# Patient Record
Sex: MALE | Race: BLACK OR AFRICAN AMERICAN | NOT HISPANIC OR LATINO | Employment: FULL TIME | URBAN - METROPOLITAN AREA
[De-identification: names, ages, dates, MRNs, and addresses within clinical notes are randomized per-mention and may not be internally consistent; named-entity substitution may affect disease eponyms.]

---

## 2017-01-04 ENCOUNTER — HISTORICAL (OUTPATIENT)
Dept: ADMINISTRATIVE | Facility: HOSPITAL | Age: 42
End: 2017-01-04

## 2020-01-14 ENCOUNTER — HISTORICAL (OUTPATIENT)
Dept: RADIOLOGY | Facility: HOSPITAL | Age: 45
End: 2020-01-14

## 2020-10-07 ENCOUNTER — HISTORICAL (OUTPATIENT)
Dept: ADMINISTRATIVE | Facility: HOSPITAL | Age: 45
End: 2020-10-07

## 2020-10-09 LAB — FINAL CULTURE: NO GROWTH

## 2021-10-25 ENCOUNTER — HISTORICAL (OUTPATIENT)
Dept: ADMINISTRATIVE | Facility: HOSPITAL | Age: 46
End: 2021-10-25

## 2021-11-08 ENCOUNTER — HISTORICAL (OUTPATIENT)
Dept: ADMINISTRATIVE | Facility: HOSPITAL | Age: 46
End: 2021-11-08

## 2022-04-10 ENCOUNTER — HISTORICAL (OUTPATIENT)
Dept: ADMINISTRATIVE | Facility: HOSPITAL | Age: 47
End: 2022-04-10
Payer: MEDICAID

## 2022-04-27 VITALS
WEIGHT: 176.13 LBS | BODY MASS INDEX: 26.09 KG/M2 | HEIGHT: 69 IN | DIASTOLIC BLOOD PRESSURE: 84 MMHG | SYSTOLIC BLOOD PRESSURE: 137 MMHG

## 2022-05-04 NOTE — HISTORICAL [facility] CERNER
This is a historical note converted from [facility]. Formatting and pictures may have been removed.  Please reference Floyd for original formatting and attached multimedia. Chief Complaint  pain to left hand.  History of Present Illness  47 yo?male?non-smoker?presents to ortho clinic for?initial visit?for?left?hand pain.?  ?   Patient reports punching object approximately on 10/10/21. Patient had acute onset swelling and later presented to the ED two days later for evaluation. Patient diagnosed with boxers fracture of the 5th mcp and sent here today for follow up.  Today: Patient localizes pain to the 5th MCP. Patient reports pain is slighlty improved since injury. Patient reports limited ROM 2/2 to pain. Patient has not splinted hand and actively treating his pain with antinflammatories.  Onset: ?Insidious over years?progressively worsening  Current pain level: 6/10??without pain medication. Quality described as??sharp?.? Patient?acknowledges?use of pain medication today.?  Medications r/t complaint: Patient reports using?OTC?medications in past including:?OTC pain relievers including ibuprofen  Modifying Factors: ?Worse with/after activity;Improved with rest  Injury:?10/10/21  Previous treatment: NSAIDs/Tylenol  Associated Symptoms:?Crepitus/Grinding; ?Numbness/ tingling;??No swelling;?No skin changes;?No weakness;?Moderate decrease in ROM;?difficulty sleeping at night s/t pain  Activity:?Daily activity / working _;?Pain occasionally interferes with ADLs (moderate)  Family History:?Family history of arthritis  PMH:? DM, HTN and Obesity ?  PCP:?Dies, NP.  ?  Review of Systems  10 review of systems negative except as stated in HPI  Physical Exam  Vitals & Measurements  HR:?89(Peripheral)? BP:?141/88?  HT:?176.00?cm? WT:?80.000?kg? BMI:?25.83?  General: well developed; well nourished; cooperative  PSYCH: alert and oriented with?appropriate mood and affect  SKIN: inspection and palpation of skin and soft tissue normal;  no scars noted on upper/lower extremities  CV: vascular integrity noted; +2 symmetrical pulses, no edema  NEURO: sensation intact by light touch; DTRs +2 bilateral and symmetrical  LYMPH: no LAD noted  ?  MSK:?LEFTHAND/ WRIST  General: No apparent distress  Inspection:?No significant deformity, no skin discoloration, no contracture;?no scars; no muscle atrophy to the thenar eminence or intrinsic muscles of the first web space  Palpation:?TTP at 5th metacarpal head.?minimal?swelling at 5th mcp ;?No bony enlargement  ROM:?  ?????5th MCP - reduced active and passive flexion. Limited active extension, however able to passively extend 5th mcp to slightly past neutral.  Strength:??  ????? strength:?Reduced?2/2 to fracture  Neurovascular:?Intact; 2+?distal pulse, sensation intact to light touch  ?  MSK:?RIGHTHAND/ WRIST  General: No apparent distress;?morbid obesity  Inspection:?No masses/cyst/nodules,?no deformity, no skin discoloration, no contracture;?no scars; no muscle atrophy to the thenar eminence or intrinsic muscles of the first web space  Palpation:?Non-tender;??No swelling;?No bony enlargement  ROM:?  ?????Open/closure of hand:?no abnormalities, movement is full and smooth  Strength:??  ????? strength:?Normal  Special Tests:  ?????Trigger finger presence:??Negative  ?????Phalen test:?Negative?  ?????Tinel test:?Negative?  ?????Khanh carpal compression:?Negative  ?????Finkelstein test:?Negative  Neurovascular:?Intact; 2+?distal pulse, sensation intact to light touch  ?  Assessment/Plan  1.?Closed boxers fracture?S62.339A  DX:?Closed boxers fracture of the left hand. Patient now presenting 15 days out from initial injury with mild 5th metacarpal neck angulation. No significant shortening, angulation or rotational deformity. Patient exam noted for stiffness likely d/t guarding but has reassuring passive ROM.  Discussed with patient diagnosis and treatment recommendations.? Handout  given.  Imaging:?radiological studies ordered and independently reviewed; discussed with patient; pending radiologist interpretation  Treatment plan: Ibuprofen and Tylenol pain PRN pain. boxer Splint applied today. Return to clinic in 1 week for repeat xrays and repeat examination. Anticipate ordering formal PT/OT.  Weight Management is paramount:?recommend at least 10 pounds weight loss  Activity:?Activity as tolerated  Therapy:?None at this time. Immobilization.  Medication:?Ibuprofen and Tylenol  RTC:?1 week  Additional work-up:?none  2.?HTN (hypertension)?I10  Nonsteroidal anti-inflammatory drugs (NSAIDs) may disrupt control of blood pressure in hypertensive patients and increase their risk of morbidity, mortality, and the costs of care. In general, people with high blood pressure should use acetaminophen or possibly aspirin for over-the-counter pain relief. Discuss with your primary health care provider if the use of any NSIADs (such as ibuprofen, ketoprofen, naproxen sodium, or ?meloxicam) is appropriate for you.  3.?DM2 (diabetes mellitus, type 2)?E11.9  Management as per PCP  Dietary and lifestyle changes  ?  Faculty Attestation:?Thomas Thomas?was seen in?[facility].??Discussed with  at the time of the visit.?History of Present Illness, Physical Exam, and Assessment and Plan reviewed. Treatment plan is reasonable and appropriate. Compliance with treatment recommendations is important.??Radiology images independently reviewed and agree with radiologist interpretation.?- Elgin Heredia MD  Orders:  XR Hand Left Minimum 3 Views, Routine, 10/25/21 14:24:00 CDT, Pain, None, Ambulatory, Rad Type, Pain in left hand, Not Scheduled, 10/25/21 14:24:00 CDT   Medications  acetaminophen-hydrocodone 325 mg-5 mg oral tablet, 1 tab(s), Oral, q6hr,? ?Not Taking, Completed Rx  diclofenac sodium 75 mg oral delayed release tablet, 75 mg= 1 tab(s), Oral, BID, PRN,? ?Not taking  Hibiclens 4% topical soap,  See Instructions,? ?Not taking  ibuprofen 800 mg oral tablet, 800 mg= 1 tab(s), Oral, TID  Jardiance 10 mg oral tablet, 10 mg= 1 tab(s), Oral, Daily,? ?Not taking  metFORMIN 1000 mg oral tablet, 1000 mg= 1 tab(s), Oral, BID, 1 refills  Norco 5 mg-325 mg oral tablet, 1 tab(s), Oral, TID, PRN  Norvasc 10 mg oral tablet, 10 mg= 1 tab(s), Oral, Daily, 5 refills  Norvasc 10 mg oral tablet, 10 mg= 1 tab(s), Oral, Daily, 1 refills  promethazine 25 mg oral tablet, 25 mg= 1 tab(s), Oral, q4hr, PRN,? ?Not taking  Protonix 40 mg ORAL enteric coated tablet, 40 mg= 1 tab(s), Oral, Daily, 1 refills  traZODONE 50 mg oral tablet ( Desyrel ), 50 mg, Oral, qPM,? ?Not taking  Zofran 8 mg oral tablet, 8 mg= 1 tab(s), Oral, TID, PRN,? ?Not taking  Zofran ODT 4 mg oral tablet, disintegrating, 4 mg= 1 tab(s), Oral, q8hr, PRN  Zofran ODT 8 mg oral tablet, disintegrating, 8 mg= 1 tab(s), Oral, TID, PRN, 1 refills  Zoloft 100 mg oral tablet, 100 mg= 1 tab(s), Oral, Daily,? ?Not taking  Diagnostic Results  Xray of the left hand - shows minimally angulated 5th metacarpal neck.

## 2022-05-04 NOTE — HISTORICAL [facility]
This is a historical note converted from [facility]. Formatting and pictures may have been removed.  Please reference [facility] for original formatting and attached multimedia. Chief Complaint  left hand pain.  History of Present Illness  45 yo?male?non-smoker?presents to ortho clinic for?follow up visit?for?left boxers fracture.  Last seen?approximately 2 weeks ago?and placed in a splint. ?Has started range of motion exercises as tolerated as instructed at previous visit with notable improvement in his pain and range of motion since the last visit. ?Only endorses minimal pain at the?left metacarpal head otherwise is doing well. ?Has been taking Mobic as needed.? Denies any?weakness or numbness and tingling.?  Current pain level: 6/10??without pain medication. Quality described as??sharp?.? Patient?acknowledges?use of pain medication today.?  Medications r/t complaint: Patient reports using?OTC?medications in past including:?OTC pain relievers including ibuprofen  Modifying Factors: ?Worse with/after activity;Improved with rest  Injury:?10/10/21  Previous treatment: Mobic and Boxers splint.  Associated Symptoms:?No Crepitus/Grinding; ?Numbness/ tingling;??No swelling;?No skin changes;?No weakness;?Mild decrease in ROM;?Denies difficulty sleeping at night s/t pain  Activity:?Daily activity / working.?Pain occasionally interferes with ADLs (mild)  Family History:?Family history of arthritis  PMH:? DM, HTN?  PCP:?Dies, NP.  Review of Systems  10 review of systems negative except as stated in HPI  Physical Exam  Vitals & Measurements  HR:?75(Peripheral)? BP:?137/84?  HT:?176.00?cm? WT:?79.900?kg? BMI:?25.79?  General: well developed; well nourished; cooperative  PSYCH: alert and oriented with?appropriate mood and affect  SKIN: inspection and palpation of skin and soft tissue normal; no scars noted on upper/lower extremities  CV: vascular integrity noted; +2 symmetrical pulses, no edema  NEURO: sensation intact by light touch;  DTRs +2 bilateral and symmetrical  LYMPH: no LAD noted  ?  MSK:?LEFTHAND/ WRIST  Inspection:?No masses/cyst/nodules,?no deformity, no skin discoloration, no contracture;?no scars; no muscle atrophy to the thenar eminence or intrinsic muscles of the first web space  Palpation:?Minimal TTP at MCP of 5th digit  ROM:?  ?????Open/closure of hand: No limitation  Strength:??  ????? strength:?Reduced, however improved from prior exam 2 weeks ago.  Neurovascular:?Intact; 2+?distal pulse, sensation intact to light touch  ?  MSK:?RIGHTHAND/ WRIST  Inspection:?No masses/cyst/nodules, ?no skin discoloration, no contracture;?no scars; no muscle atrophy to the thenar eminence or intrinsic muscles of the first web space  Palpation:?Non-tender;??No swelling;?No bony enlargement  ROM:?  ?????Open/closure of hand:?no abnormalities, movement is full and smooth  Strength:??  ????? strength:?Normal  Neurovascular:?Intact; 2+?distal pulse, sensation intact to light touch  ?  Assessment/Plan  1.?Fracture of neck of metacarpal bone of left hand?S62.339A  ?DX:?Closed boxers fracture of the left hand. Patient now presenting?4 weeks out ?from initial injury with 5th metacarpal neck fracture?with minimal?angulation. No significant shortening or rotational deformity. Non-displaced?fracture of the 4th digit appears to be intact. Patient has had no pain or swelling at the site of injury since initial evaluation and appears to be stable on xray today. ?Patients exam noted for stiffness but improved from prior assessment with reassuring active and passive ROM.? Discussed with patient diagnosis and treatment recommendations.? Handout given.  Imaging:?radiological studies ordered and independently reviewed; discussed with patient; pending radiologist interpretation  Treatment plan: Ibuprofen and Tylenol pain PRN pain. Wean boxer Splint. Return to clinic in 4?week for repeat xrays and examination. Escalate ROM exercises. Formal PT  PRN.  Activity:?Activity as tolerated  Therapy:?Formal PT ordered. Patient to continue HEP and participate in formal PT if stiffness persists  Medication:?Ibuprofen and Tylenol  RTC: 4 weeks  Additional work-up:?none  Ordered:  Clinic Follow up, *Est. 12/06/21 3:00:00 CST, Order for future visit, Fracture of neck of metacarpal bone of left hand, [facility]  ?  2.?DM2 (diabetes mellitus, type 2)?E11.9  Dietary and lifestyle changes  Med management as per PCP  Educated on DM2 control essential?to fracture healing  ?  3.?HTN (hypertension)?I10  Nonsteroidal anti-inflammatory drugs (NSAIDs) may disrupt control of blood pressure in hypertensive patients and increase their risk of morbidity, mortality, and the costs of care. In general, people with high blood pressure should use acetaminophen or possibly aspirin for over-the-counter pain relief. Discuss with your primary health care provider if the use of any NSIADs (such as ibuprofen, ketoprofen, naproxen sodium, or ?meloxicam) is appropriate for you.  ?   Faculty Attestation:?Thomas Thomas?was seen in?[facility].??Discussed with  at the time of the visit.?History of Present Illness, Physical Exam, and Assessment and Plan reviewed. Treatment plan is reasonable and appropriate. Compliance with treatment recommendations is important.??Radiology images independently reviewed and agree with radiologist interpretation.?- Elgin Heredia MD  ?   Medications  acetaminophen-hydrocodone 325 mg-5 mg oral tablet, 1 tab(s), Oral, q6hr,? ?Not Taking, Completed Rx  diclofenac sodium 75 mg oral delayed release tablet, 75 mg= 1 tab(s), Oral, BID, PRN,? ?Not taking  Hibiclens 4% topical soap, See Instructions,? ?Not taking  Jardiance 10 mg oral tablet, 10 mg= 1 tab(s), Oral, Daily,? ?Not taking  metFORMIN 1000 mg oral tablet, 1000 mg= 1 tab(s), Oral, BID, 1 refills  Mobic 15 mg oral tablet, 15 mg= 1 tab(s), Oral, Daily, PRN  Norvasc 10 mg oral  tablet, 10 mg= 1 tab(s), Oral, Daily, 5 refills  Norvasc 10 mg oral tablet, 10 mg= 1 tab(s), Oral, Daily, 1 refills  promethazine 25 mg oral tablet, 25 mg= 1 tab(s), Oral, q4hr, PRN,? ?Not taking  Protonix 40 mg ORAL enteric coated tablet, 40 mg= 1 tab(s), Oral, Daily, 1 refills  traZODONE 50 mg oral tablet ( Desyrel ), 50 mg, Oral, qPM,? ?Not taking  Zofran 8 mg oral tablet, 8 mg= 1 tab(s), Oral, TID, PRN,? ?Not taking  Zofran ODT 4 mg oral tablet, disintegrating, 4 mg= 1 tab(s), Oral, q8hr, PRN  Zofran ODT 8 mg oral tablet, disintegrating, 8 mg= 1 tab(s), Oral, TID, PRN, 1 refills  Zoloft 100 mg oral tablet, 100 mg= 1 tab(s), Oral, Daily,? ?Not taking  Diagnostic Results  (10/25/2021 14:30 CDT XR Hand Left Minimum 3 Views)  FINDINGS:  ?  Fracture of the distal aspect of the fifth metacarpal again identified  with no significant change in position and alignment as compared with  the previous exam.  Fracture at the base of the distal phalanx of the fourth digit again  identified  There is some narrowing of the interphalangeal joints with some  narrowing of the first carpometacarpal joint  No blastic or lytic lesions.  Soft tissues within normal limits.  ?  IMPRESSION:  ?  Fractures as above with no significant change.  ?  Degenerative change [1]  ?  My Interpretation: Agree with findings above. Patient 5th mcp neck fracture and fracture at base of 4th MCP.     [1]?XR Hand Left Minimum 3 Views; Bhupinder Maloney MD 10/25/2021 14:30 CDT

## 2022-05-31 ENCOUNTER — HOSPITAL ENCOUNTER (EMERGENCY)
Facility: HOSPITAL | Age: 47
Discharge: HOME | End: 2022-05-31
Attending: INTERNAL MEDICINE
Payer: MEDICAID

## 2022-05-31 ENCOUNTER — APPOINTMENT (OUTPATIENT)
Dept: RADIOLOGY | Facility: HOSPITAL | Age: 47
End: 2022-05-31
Attending: INTERNAL MEDICINE
Payer: MEDICAID

## 2022-05-31 VITALS
BODY MASS INDEX: 29.92 KG/M2 | HEART RATE: 93 BPM | TEMPERATURE: 98 F | WEIGHT: 202 LBS | SYSTOLIC BLOOD PRESSURE: 156 MMHG | OXYGEN SATURATION: 98 % | DIASTOLIC BLOOD PRESSURE: 98 MMHG | RESPIRATION RATE: 22 BRPM | HEIGHT: 69 IN

## 2022-05-31 DIAGNOSIS — R07.9 CHEST PAIN, UNSPECIFIED TYPE: Primary | ICD-10-CM

## 2022-05-31 DIAGNOSIS — F41.9 ANXIETY: ICD-10-CM

## 2022-05-31 DIAGNOSIS — R07.9 CHEST PAIN: ICD-10-CM

## 2022-05-31 DIAGNOSIS — M62.82 NON-TRAUMATIC RHABDOMYOLYSIS: ICD-10-CM

## 2022-05-31 LAB
ALBUMIN SERPL-MCNC: 3.8 GM/DL (ref 3.5–5)
ALBUMIN/GLOB SERPL: 1.2 RATIO (ref 1.1–2)
ALP SERPL-CCNC: 71 UNIT/L (ref 40–150)
ALT SERPL-CCNC: 18 UNIT/L (ref 0–55)
AMPHET UR QL SCN: NEGATIVE
APPEARANCE UR: CLEAR
APTT PPP: 32.1 SECONDS (ref 23.4–33.9)
AST SERPL-CCNC: 20 UNIT/L (ref 5–34)
BARBITURATE SCN PRESENT UR: NEGATIVE
BASOPHILS # BLD AUTO: 0.03 X10(3)/MCL (ref 0–0.2)
BASOPHILS NFR BLD AUTO: 0.5 %
BENZODIAZ UR QL SCN: NEGATIVE
BILIRUB UR QL STRIP.AUTO: NEGATIVE MG/DL
BILIRUBIN DIRECT+TOT PNL SERPL-MCNC: 0.5 MG/DL
BNP BLD-MCNC: 23.1 PG/ML
BUN SERPL-MCNC: 12.5 MG/DL (ref 8.9–20.6)
CALCIUM SERPL-MCNC: 9 MG/DL (ref 8.4–10.2)
CANNABINOIDS UR QL SCN: POSITIVE
CHLORIDE SERPL-SCNC: 106 MMOL/L (ref 98–107)
CK MB SERPL-MCNC: 2.2 NG/ML
CK SERPL-CCNC: 467 U/L (ref 30–200)
CO2 SERPL-SCNC: 21 MMOL/L (ref 22–29)
COCAINE UR QL SCN: NEGATIVE
COLOR UR AUTO: YELLOW
CREAT SERPL-MCNC: 1.33 MG/DL (ref 0.73–1.18)
EOSINOPHIL # BLD AUTO: 0.07 X10(3)/MCL (ref 0–0.9)
EOSINOPHIL NFR BLD AUTO: 1.2 %
ERYTHROCYTE [DISTWIDTH] IN BLOOD BY AUTOMATED COUNT: 13.7 % (ref 11.5–17)
GLOBULIN SER-MCNC: 3.1 GM/DL (ref 2.4–3.5)
GLUCOSE SERPL-MCNC: 217 MG/DL (ref 74–100)
GLUCOSE UR QL STRIP.AUTO: NEGATIVE MG/DL
HCT VFR BLD AUTO: 46.2 % (ref 42–52)
HGB BLD-MCNC: 14.9 GM/DL (ref 14–18)
IMM GRANULOCYTES # BLD AUTO: 0.01 X10(3)/MCL (ref 0–0.02)
IMM GRANULOCYTES NFR BLD AUTO: 0.2 % (ref 0–0.43)
INR BLD: 1.08 (ref 2–3)
KETONES UR QL STRIP.AUTO: NEGATIVE MG/DL
LEUKOCYTE ESTERASE UR QL STRIP.AUTO: NEGATIVE UNIT/L
LYMPHOCYTES # BLD AUTO: 1.73 X10(3)/MCL (ref 0.6–4.6)
LYMPHOCYTES NFR BLD AUTO: 28.6 %
MAGNESIUM SERPL-MCNC: 2 MG/DL (ref 1.6–2.6)
MCH RBC QN AUTO: 27.9 PG (ref 27–31)
MCHC RBC AUTO-ENTMCNC: 32.3 MG/DL (ref 33–36)
MCV RBC AUTO: 86.4 FL (ref 80–94)
MDMA UR QL SCN: NEGATIVE
MONOCYTES # BLD AUTO: 0.38 X10(3)/MCL (ref 0.1–1.3)
MONOCYTES NFR BLD AUTO: 6.3 %
NEUTROPHILS # BLD AUTO: 3.8 X10(3)/MCL (ref 2.1–9.2)
NEUTROPHILS NFR BLD AUTO: 63.2 %
NITRITE UR QL STRIP.AUTO: NEGATIVE
NRBC BLD AUTO-RTO: 0 %
OPIATES UR QL SCN: NEGATIVE
PCP UR QL: NEGATIVE
PH UR STRIP.AUTO: 6 [PH]
PH UR: 6 [PH] (ref 3–11)
PLATELET # BLD AUTO: 212 X10(3)/MCL (ref 130–400)
PMV BLD AUTO: 10.5 FL (ref 9.4–12.4)
POCT GLUCOSE: 207 MG/DL (ref 70–110)
POTASSIUM SERPL-SCNC: 3.7 MMOL/L (ref 3.5–5.1)
PROT SERPL-MCNC: 6.9 GM/DL (ref 6.4–8.3)
PROT UR QL STRIP.AUTO: NEGATIVE MG/DL
PROTHROMBIN TIME: 13.8 SECONDS (ref 11.7–14.5)
RBC # BLD AUTO: 5.35 X10(6)/MCL (ref 4.7–6.1)
RBC UR QL AUTO: NEGATIVE UNIT/L
SODIUM SERPL-SCNC: 139 MMOL/L (ref 136–145)
SP GR UR STRIP.AUTO: 1.01
SPECIFIC GRAVITY, URINE AUTO (.000) (OHS): 1.01 (ref 1–1.03)
TROPONIN I SERPL-MCNC: 0.01 NG/ML (ref 0–0.04)
UROBILINOGEN UR STRIP-ACNC: 0.2 MG/DL
WBC # SPEC AUTO: 6.1 X10(3)/MCL (ref 4.5–11.5)

## 2022-05-31 PROCEDURE — 93005 ELECTROCARDIOGRAM TRACING: CPT

## 2022-05-31 PROCEDURE — 82553 CREATINE MB FRACTION: CPT | Performed by: INTERNAL MEDICINE

## 2022-05-31 PROCEDURE — 93010 EKG 12-LEAD: ICD-10-PCS | Mod: ,,, | Performed by: INTERNAL MEDICINE

## 2022-05-31 PROCEDURE — 81003 URINALYSIS AUTO W/O SCOPE: CPT | Performed by: INTERNAL MEDICINE

## 2022-05-31 PROCEDURE — 84484 ASSAY OF TROPONIN QUANT: CPT | Performed by: INTERNAL MEDICINE

## 2022-05-31 PROCEDURE — 83735 ASSAY OF MAGNESIUM: CPT | Performed by: INTERNAL MEDICINE

## 2022-05-31 PROCEDURE — 85730 THROMBOPLASTIN TIME PARTIAL: CPT | Performed by: INTERNAL MEDICINE

## 2022-05-31 PROCEDURE — 25000003 PHARM REV CODE 250: Performed by: INTERNAL MEDICINE

## 2022-05-31 PROCEDURE — 80307 DRUG TEST PRSMV CHEM ANLYZR: CPT | Performed by: INTERNAL MEDICINE

## 2022-05-31 PROCEDURE — 36415 COLL VENOUS BLD VENIPUNCTURE: CPT | Performed by: INTERNAL MEDICINE

## 2022-05-31 PROCEDURE — 93010 ELECTROCARDIOGRAM REPORT: CPT | Mod: ,,, | Performed by: INTERNAL MEDICINE

## 2022-05-31 PROCEDURE — 85610 PROTHROMBIN TIME: CPT | Performed by: INTERNAL MEDICINE

## 2022-05-31 PROCEDURE — 99285 EMERGENCY DEPT VISIT HI MDM: CPT | Mod: 25

## 2022-05-31 PROCEDURE — 82962 GLUCOSE BLOOD TEST: CPT

## 2022-05-31 PROCEDURE — 85025 COMPLETE CBC W/AUTO DIFF WBC: CPT | Performed by: INTERNAL MEDICINE

## 2022-05-31 PROCEDURE — 83880 ASSAY OF NATRIURETIC PEPTIDE: CPT | Performed by: INTERNAL MEDICINE

## 2022-05-31 PROCEDURE — 82550 ASSAY OF CK (CPK): CPT | Performed by: INTERNAL MEDICINE

## 2022-05-31 PROCEDURE — 80053 COMPREHEN METABOLIC PANEL: CPT | Performed by: INTERNAL MEDICINE

## 2022-05-31 PROCEDURE — 71045 X-RAY EXAM CHEST 1 VIEW: CPT | Mod: TC

## 2022-05-31 RX ORDER — NAPROXEN SODIUM 220 MG/1
324 TABLET, FILM COATED ORAL ONCE
Status: COMPLETED | OUTPATIENT
Start: 2022-05-31 | End: 2022-05-31

## 2022-05-31 RX ORDER — ONDANSETRON 4 MG/1
4 TABLET, ORALLY DISINTEGRATING ORAL EVERY 6 HOURS PRN
Qty: 15 TABLET | Refills: 0 | Status: SHIPPED | OUTPATIENT
Start: 2022-05-31 | End: 2023-04-23

## 2022-05-31 RX ORDER — NITROGLYCERIN 0.4 MG/1
0.4 TABLET SUBLINGUAL EVERY 5 MIN PRN
Qty: 100 TABLET | Refills: 0 | Status: SHIPPED | OUTPATIENT
Start: 2022-05-31 | End: 2023-05-31

## 2022-05-31 RX ORDER — NAPROXEN SODIUM 220 MG/1
81 TABLET, FILM COATED ORAL DAILY
Qty: 90 TABLET | Refills: 0 | Status: SHIPPED | OUTPATIENT
Start: 2022-05-31 | End: 2022-08-29

## 2022-05-31 RX ORDER — AMLODIPINE BESYLATE 10 MG/1
10 TABLET ORAL DAILY
COMMUNITY
Start: 2021-07-25 | End: 2023-04-23

## 2022-05-31 RX ORDER — METFORMIN HYDROCHLORIDE 1000 MG/1
1000 TABLET ORAL DAILY
COMMUNITY

## 2022-05-31 RX ADMIN — ASPIRIN 81 MG CHEWABLE TABLET 324 MG: 81 TABLET CHEWABLE at 04:05

## 2022-05-31 NOTE — ED PROVIDER NOTES
Source of History:  Patient, no limitations    Chief complaint:  Chest Pain (Pt to er c/o intermittent chestpain and sob onset 4 weeks ago.)      HPI:  Thomas Thomas is a 47 y.o. male presenting with Chest Pain (Pt to er c/o intermittent chestpain and sob onset 4 weeks ago.)       Intermittent chest pain for the past few weeks, this episode started around noon, atypical in description, tightness, not exertional, says he feels this way when he is anxious    The patient complains of chest pain and exertional chest pressure/discomfort. The discomfort is described as chest tightness, pressure-like with radiation to the none. Onset of symptoms was abrupt starting 4 weeks ago, intermittent course since that time. Each episode lasts for 3 hours. The episodes are brought on by unable to say . The patient also complains of anxiety over recent diagnosis of vascular calcifications by PCP. The patient denies fever. Patient's cardiac risk factors are diabetes mellitus, dyslipidemia, hypertension, male gender, obesity (BMI >= 30 kg/m2) and sedentary lifestyle. .  Care prior to arrival consisted of rest, with minimal relief.          Review of Systems   Constitutional symptoms:  Negative except as documented in HPI.   Skin symptoms:  Negative except as documented in HPI.   Eye symptoms:  Negative except as documented in HPI.   ENMT symptoms:  Negative except as documented in HPI.   Respiratory symptoms:  Shortness of breath  Cardiovascular symptoms:  Intermittent chest pain, no palpitations  Gastrointestinal symptoms:  Negative except as documented in HPI.    Genitourinary symptoms:  Negative except as documented in HPI.   Musculoskeletal symptoms:  Negative except as documented in HPI.   Neurologic symptoms:  Negative except as documented in HPI.   Psychiatric symptoms:  anxiety   Allergy/immunologic symptoms:  Negative except as documented in HPI.             Additional review of systems information: All other systems  "reviewed and otherwise negative.      Review of patient's allergies indicates:   Allergen Reactions    Iodine Nausea And Vomiting     Other reaction(s): Hives       PMH:  As per HPI and below:    No past medical history on file.     No family history on file.    No past surgical history on file.         There is no problem list on file for this patient.       Physical Exam:    BP (!) 156/98 (BP Location: Left arm, Patient Position: Sitting)   Pulse 93   Temp 97.7 °F (36.5 °C) (Temporal)   Resp (!) 22   Ht 5' 9" (1.753 m)   Wt 91.6 kg (202 lb)   SpO2 98%   BMI 29.83 kg/m²     Nursing note and vital signs reviewed.    General:  Alert, mild acute distress.   Skin: Normal for Ethnic Origin, No cyanosis  Eye: Vision unchanged, Pupils symmetric, No icterus   Cardiovascular:  Regular rate and rhythm, No edema  Chest Wall: No deformity, equal chest rise  Respiratory:  Lungs are clear to auscultation, respirations are non-labored.    Musculoskeletal:  No deformity, Normal perfusion to all extremities  Back: No bony tenderness  : No suprapubic pain, No CVA tenderness  Gastrointestinal:  Soft, Nontender, Non distended, Normal bowel sounds.    Neurological:  Alert and oriented to person, place, time, and situation, normal motor observed, normal speech observed.    Psychiatric:  Cooperative, anxious mood & affect.        Labs that have been ordered have been independently reviewed and interpreted by myself.     Old Chart Reviewed.      Initial Impression/ Differential Dx:      MDM:      Reviewed Nurses Note.    Reviewed Pertinent old records.    Orders Placed This Encounter    Pulse Oximeter    X-Ray Chest 1 View    CBC Auto Differential    Comprehensive Metabolic Panel    Protime-INR    APTT    BNP    Troponin I    Magnesium    Urinalysis, Reflex to Urine Culture Urine, Clean Catch    Drug Screen, Urine    CK-MB    CK    CBC with Differential    Ambulatory referral/consult to Cardiology    Cardiac " Monitoring - Adult    EKG 12-lead    POCT glucose    POCT glucose    aspirin chewable tablet 324 mg    ondansetron (ZOFRAN-ODT) 4 MG TbDL    nitroGLYCERIN (NITROSTAT) 0.4 MG SL tablet    aspirin 81 MG Chew                    Labs Reviewed   COMPREHENSIVE METABOLIC PANEL - Abnormal; Notable for the following components:       Result Value    Carbon Dioxide 21 (*)     Glucose Level 217 (*)     Creatinine 1.33 (*)     All other components within normal limits   PROTIME-INR - Abnormal; Notable for the following components:    INR 1.08 (*)     All other components within normal limits   CK - Abnormal; Notable for the following components:    Creatine Kinase 467 (*)     All other components within normal limits   CBC WITH DIFFERENTIAL - Abnormal; Notable for the following components:    MCHC 32.3 (*)     All other components within normal limits   POCT GLUCOSE - Abnormal; Notable for the following components:    POCT Glucose 207 (*)     All other components within normal limits   APTT - Normal   B-TYPE NATRIURETIC PEPTIDE - Normal   TROPONIN I - Normal   MAGNESIUM - Normal   CK-MB - Normal   CBC W/ AUTO DIFFERENTIAL    Narrative:     The following orders were created for panel order CBC Auto Differential.  Procedure                               Abnormality         Status                     ---------                               -----------         ------                     CBC with Differential[[id number]]        Abnormal            Final result                 Please view results for these tests on the individual orders.   URINALYSIS, REFLEX TO URINE CULTURE   DRUG SCREEN, URINE (BEAKER)   POCT GLUCOSE MONITORING CONTINUOUS          X-Ray Chest 1 View   Final Result      No acute findings.         Electronically signed by: Carlos Taylor   Date:    05/31/2022   Time:    16:33           Admission on 05/31/2022   Component Date Value Ref Range Status    POCT Glucose 05/31/2022 207 (A) 70 - 110 mg/dL Final     Sodium Level 05/31/2022 139  136 - 145 mmol/L Final    Potassium Level 05/31/2022 3.7  3.5 - 5.1 mmol/L Final    Chloride 05/31/2022 106  98 - 107 mmol/L Final    Carbon Dioxide 05/31/2022 21 (A) 22 - 29 mmol/L Final    Glucose Level 05/31/2022 217 (A) 74 - 100 mg/dL Final    Blood Urea Nitrogen 05/31/2022 12.5  8.9 - 20.6 mg/dL Final    Creatinine 05/31/2022 1.33 (A) 0.73 - 1.18 mg/dL Final    Calcium Level Total 05/31/2022 9.0  8.4 - 10.2 mg/dL Final    Protein Total 05/31/2022 6.9  6.4 - 8.3 gm/dL Final    Albumin Level 05/31/2022 3.8  3.5 - 5.0 gm/dL Final    Globulin 05/31/2022 3.1  2.4 - 3.5 gm/dL Final    Albumin/Globulin Ratio 05/31/2022 1.2  1.1 - 2.0 ratio Final    Bilirubin Total 05/31/2022 0.5  <=1.5 mg/dL Final    Alkaline Phosphatase 05/31/2022 71  40 - 150 unit/L Final    Alanine Aminotransferase 05/31/2022 18  0 - 55 unit/L Final    Aspartate Aminotransferase 05/31/2022 20  5 - 34 unit/L Final    Estimated GFR- 05/31/2022 >60  mls/min/1.73/m2 Final    PT 05/31/2022 13.8  11.7 - 14.5 seconds Final    INR 05/31/2022 1.08 (A) 2.00 - 3.00 Final    PTT 05/31/2022 32.1  23.4 - 33.9 seconds Final    Natriuretic Peptide 05/31/2022 23.1  <=100.0 pg/mL Final    Troponin-I 05/31/2022 0.010  0.000 - 0.045 ng/mL Final    Magnesium Level 05/31/2022 2.00  1.60 - 2.60 mg/dL Final    Color, UA 05/31/2022 Yellow  Yellow, Colorless, Other, Clear Final    Appearance, UA 05/31/2022 Clear  Clear Final    Specific Gravity, UA 05/31/2022 1.015   Final    pH, UA 05/31/2022 6.0  5.0, 5.5, 6.0, 6.5, 7.0, 7.5, 8.0, 8.5 Final    Protein, UA 05/31/2022 Negative  Negative, 300  mg/dL Final    Glucose, UA 05/31/2022 Negative  Negative, Normal mg/dL Final    Ketones, UA 05/31/2022 Negative  Negative, +1, +2, +3, +4, +5, >=160 mg/dL Final    Blood, UA 05/31/2022 Negative  Negative unit/L Final    Bilirubin, UA 05/31/2022 Negative  Negative mg/dL Final    Urobilinogen, UA 05/31/2022 0.2   0.2, 1.0, Normal mg/dL Final    Nitrites, UA 05/31/2022 Negative  Negative Final    Leukocyte Esterase, UA 05/31/2022 Negative  Negative, 75  unit/L Final    Creatine Kinase MB 05/31/2022 2.2  <=7.2 ng/mL Final    Creatine Kinase 05/31/2022 467 (A) 30 - 200 U/L Final    WBC 05/31/2022 6.1  4.5 - 11.5 x10(3)/mcL Final    RBC 05/31/2022 5.35  4.70 - 6.10 x10(6)/mcL Final    Hgb 05/31/2022 14.9  14.0 - 18.0 gm/dL Final    Hct 05/31/2022 46.2  42.0 - 52.0 % Final    MCV 05/31/2022 86.4  80.0 - 94.0 fL Final    MCH 05/31/2022 27.9  27.0 - 31.0 pg Final    MCHC 05/31/2022 32.3 (A) 33.0 - 36.0 mg/dL Final    RDW 05/31/2022 13.7  11.5 - 17.0 % Final    Platelet 05/31/2022 212  130 - 400 x10(3)/mcL Final    MPV 05/31/2022 10.5  9.4 - 12.4 fL Final    Neut % 05/31/2022 63.2  % Final    Lymph % 05/31/2022 28.6  % Final    Mono % 05/31/2022 6.3  % Final    Eos % 05/31/2022 1.2  % Final    Basophil % 05/31/2022 0.5  % Final    Lymph # 05/31/2022 1.73  0.6 - 4.6 x10(3)/mcL Final    Neut # 05/31/2022 3.8  2.1 - 9.2 x10(3)/mcL Final    Mono # 05/31/2022 0.38  0.1 - 1.3 x10(3)/mcL Final    Eos # 05/31/2022 0.07  0 - 0.9 x10(3)/mcL Final    Baso # 05/31/2022 0.03  0 - 0.2 x10(3)/mcL Final    IG# 05/31/2022 0.01  0 - 0.0155 x10(3)/mcL Final    IG% 05/31/2022 0.2  0 - 0.43 % Final    NRBC% 05/31/2022 0.0  % Final       Imaging Results          X-Ray Chest 1 View (Final result)  Result time 05/31/22 16:33:07    Final result by Carlos Taylor MD (05/31/22 16:33:07)                 Impression:      No acute findings.      Electronically signed by: Carlos Taylor  Date:    05/31/2022  Time:    16:33             Narrative:    EXAMINATION:  XR CHEST 1 VIEW    CLINICAL HISTORY:  chest pain;    COMPARISON:  28 July 2021    FINDINGS:  Portable frontal view of the chest was obtained. Heart is not enlarged.  The lungs are clear.  No pneumothorax or significant effusion                                  ECG Results           EKG 12-lead (Preliminary result)  Result time 05/31/22 16:29:51    ED Interpretation by Jese Espinal DO (05/31/22 16:29:51, [facility] - Emergency Dept, Emergency Medicine)    NSR at 84 bpm, normal WV, normal QRS, normal QT, inferolateral T wave changes                                                             Diagnostic Impression:    1. Chest pain, unspecified type    2. Chest pain    3. Anxiety    4. Non-traumatic rhabdomyolysis         ED Disposition Condition    Discharge Stable           Follow-up Information     Follow up In 1 week.                        ED Prescriptions     Medication Sig Dispense Start Date End Date Auth. Provider    ondansetron (ZOFRAN-ODT) 4 MG TbDL Take 1 tablet (4 mg total) by mouth every 6 (six) hours as needed. 15 tablet 5/31/2022  Jese Espinal DO    nitroGLYCERIN (NITROSTAT) 0.4 MG SL tablet Place 1 tablet (0.4 mg total) under the tongue every 5 (five) minutes as needed for Chest pain. 100 tablet 5/31/2022 5/31/2023 Jese Espinal DO    aspirin 81 MG Chew Take 1 tablet (81 mg total) by mouth once daily. 90 tablet 5/31/2022 8/29/2022 Jese Espinal DO        Follow-up Information     Follow up With Specialties Details Why Contact Info      In 1 week             Jese Espinal DO  05/31/22 1299

## 2022-08-10 ENCOUNTER — HOSPITAL ENCOUNTER (EMERGENCY)
Facility: HOSPITAL | Age: 47
Discharge: HOME | End: 2022-08-10
Attending: FAMILY MEDICINE
Payer: MEDICAID

## 2022-08-10 VITALS
HEIGHT: 69 IN | HEART RATE: 84 BPM | TEMPERATURE: 98 F | BODY MASS INDEX: 29.33 KG/M2 | SYSTOLIC BLOOD PRESSURE: 151 MMHG | WEIGHT: 198 LBS | DIASTOLIC BLOOD PRESSURE: 93 MMHG | OXYGEN SATURATION: 97 % | RESPIRATION RATE: 16 BRPM

## 2022-08-10 DIAGNOSIS — K04.7 DENTAL ABSCESS: Primary | ICD-10-CM

## 2022-08-10 DIAGNOSIS — R06.00 DYSPNEA, UNSPECIFIED TYPE: Primary | ICD-10-CM

## 2022-08-10 DIAGNOSIS — L02.01 FACIAL ABSCESS: ICD-10-CM

## 2022-08-10 PROCEDURE — 10060 I&D ABSCESS SIMPLE/SINGLE: CPT

## 2022-08-10 PROCEDURE — 25000003 PHARM REV CODE 250: Performed by: FAMILY MEDICINE

## 2022-08-10 PROCEDURE — 99284 EMERGENCY DEPT VISIT MOD MDM: CPT | Mod: 25

## 2022-08-10 RX ORDER — CHLORHEXIDINE GLUCONATE ORAL RINSE 1.2 MG/ML
15 SOLUTION DENTAL 2 TIMES DAILY
Qty: 300 ML | Refills: 0 | Status: SHIPPED | OUTPATIENT
Start: 2022-08-10 | End: 2022-08-20

## 2022-08-10 RX ORDER — CLINDAMYCIN HYDROCHLORIDE 300 MG/1
300 CAPSULE ORAL EVERY 6 HOURS
Qty: 40 CAPSULE | Refills: 0 | Status: SHIPPED | OUTPATIENT
Start: 2022-08-10 | End: 2022-08-20

## 2022-08-10 RX ORDER — HYDROCODONE BITARTRATE AND ACETAMINOPHEN 7.5; 325 MG/1; MG/1
1 TABLET ORAL EVERY 6 HOURS PRN
Qty: 12 TABLET | Refills: 0 | Status: SHIPPED | OUTPATIENT
Start: 2022-08-10 | End: 2022-08-13

## 2022-08-10 RX ORDER — CLINDAMYCIN HYDROCHLORIDE 300 MG/1
300 CAPSULE ORAL
Status: COMPLETED | OUTPATIENT
Start: 2022-08-10 | End: 2022-08-10

## 2022-08-10 RX ORDER — CEPHALEXIN 500 MG/1
500 CAPSULE ORAL 4 TIMES DAILY
Qty: 40 CAPSULE | Refills: 0 | Status: SHIPPED | OUTPATIENT
Start: 2022-08-10 | End: 2022-08-20

## 2022-08-10 RX ORDER — HYDROCODONE BITARTRATE AND ACETAMINOPHEN 10; 325 MG/1; MG/1
1 TABLET ORAL
Status: COMPLETED | OUTPATIENT
Start: 2022-08-10 | End: 2022-08-10

## 2022-08-10 RX ORDER — CEPHALEXIN 250 MG/1
500 CAPSULE ORAL
Status: COMPLETED | OUTPATIENT
Start: 2022-08-10 | End: 2022-08-10

## 2022-08-10 RX ADMIN — HYDROCODONE BITARTRATE AND ACETAMINOPHEN 1 TABLET: 10; 325 TABLET ORAL at 02:08

## 2022-08-10 RX ADMIN — CEPHALEXIN 500 MG: 250 CAPSULE ORAL at 02:08

## 2022-08-10 RX ADMIN — CLINDAMYCIN HYDROCHLORIDE 300 MG: 300 CAPSULE ORAL at 02:08

## 2022-08-10 NOTE — ED PROVIDER NOTES
Encounter Date: 8/10/2022       History     Chief Complaint   Patient presents with    Dental Pain     47-year-old male presents with bilateral lower dental pain right greater than left for the past 1 week.  Patient has followed up with [facility] and will be able to see a dentist soon.  He denies fever drainage.  Denies recent trauma.  No other complaints.        Review of patient's allergies indicates:   Allergen Reactions    Iodine Nausea And Vomiting     Other reaction(s): Hives     No past medical history on file.  No past surgical history on file.  No family history on file.     Review of Systems   Constitutional: Negative.    HENT: Positive for dental problem.    Eyes: Negative.    Respiratory: Negative.    Cardiovascular: Negative.    Gastrointestinal: Negative.    Endocrine: Negative.    Genitourinary: Negative.    Musculoskeletal: Negative.    Skin: Negative.    Allergic/Immunologic: Negative.    Neurological: Negative.    Hematological: Negative.    Psychiatric/Behavioral: Negative.        Physical Exam     Initial Vitals [08/10/22 0032]   BP Pulse Resp Temp SpO2   (!) 151/93 84 18 98.4 °F (36.9 °C) 97 %      MAP       --         Physical Exam    Nursing note and vitals reviewed.  Constitutional: He appears well-developed and well-nourished.   HENT:   Head: Normocephalic and atraumatic.   Overall extremity poor dentition with multiple cavities and fractured teeth.  Patient does have a 1 cm x 1.5 cm indurated skin lesion just superior to the mandibular angle consistent with abscess.  There is moderate tender to palpation.  No fluctuance.   Eyes: Conjunctivae and EOM are normal. Pupils are equal, round, and reactive to light.   Neck: Neck supple.   Normal range of motion.  Cardiovascular: Normal rate and regular rhythm.   Pulmonary/Chest: Breath sounds normal.   Abdominal: Abdomen is soft. Bowel sounds are normal.   Musculoskeletal:         General: Normal range of motion.      Cervical back: Normal range of  motion and neck supple.     Neurological: He is alert and oriented to person, place, and time. He has normal strength. GCS score is 15. GCS eye subscore is 4. GCS verbal subscore is 5. GCS motor subscore is 6.   Skin: Skin is warm. Capillary refill takes less than 2 seconds.   Psychiatric: He has a normal mood and affect.         ED Course   I & D - Incision and Drainage    Date/Time: 8/10/2022 3:34 AM  Location procedure was performed: [facility] EMERGENCY DEPARTMENT  Performed by: Tapan Meade MD  Authorized by: Tapan Meade MD   Consent Done: Yes  Consent: Verbal consent obtained.  Consent given by: patient  Patient understanding: patient states understanding of the procedure being performed  Type: abscess  Body area: head/neck  Location details: face  Scalpel size: 22g needle.  Complexity: simple  Drainage: pus  Drainage amount: moderate  Wound treatment: wound left open  Complications: No        Labs Reviewed - No data to display       Imaging Results    None          Medications   HYDROcodone-acetaminophen  mg per tablet 1 tablet (1 tablet Oral Given 8/10/22 0201)   clindamycin capsule 300 mg (300 mg Oral Given 8/10/22 0201)   cephALEXin capsule 500 mg (500 mg Oral Given 8/10/22 0200)                          Clinical Impression:   Final diagnoses:  [K04.7] Dental abscess (Primary)  [L02.01] Facial abscess          ED Disposition Condition    Discharge Stable        ED Prescriptions     Medication Sig Dispense Start Date End Date Auth. Provider    HYDROcodone-acetaminophen (NORCO) 7.5-325 mg per tablet Take 1 tablet by mouth every 6 (six) hours as needed for Pain. 12 tablet 8/10/2022 8/13/2022 Tapan Meade MD    clindamycin (CLEOCIN) 300 MG capsule Take 1 capsule (300 mg total) by mouth every 6 (six) hours. for 10 days 40 capsule 8/10/2022 8/20/2022 Tapan Meade MD    cephALEXin (KEFLEX) 500 MG capsule Take 1 capsule (500 mg total) by mouth 4 (four) times daily. for 10 days 40 capsule 8/10/2022  8/20/2022 Tapan Meade MD    chlorhexidine (PERIDEX) 0.12 % solution Use as directed 15 mLs in the mouth or throat 2 (two) times daily. for 10 days 300 mL 8/10/2022 8/20/2022 Tapan Meade MD        Follow-up Information     Follow up With Specialties Details Why Contact Info    Jatin August NP Family Medicine   [address] [place] [address] [address] [place] [id number]  [phone]      Patrick Casey MD Family Medicine   [address] [place] [id number]  [phone]             Tapan Meade MD  08/10/22 9251

## 2022-09-02 ENCOUNTER — HOSPITAL ENCOUNTER (OUTPATIENT)
Dept: PULMONOLOGY | Facility: HOSPITAL | Age: 47
Discharge: HOME | End: 2022-09-02
Payer: MEDICAID

## 2022-09-02 DIAGNOSIS — R06.00 DYSPNEA, UNSPECIFIED TYPE: ICD-10-CM

## 2022-09-02 LAB
DLCO SINGLE BREATH LLN: 23.55
DLCO SINGLE BREATH PRE REF: 87.7 %
DLCO SINGLE BREATH REF: 30.48
DLCOC SBVA LLN: 3.16
DLCOC SBVA REF: 4.4
DLCOC SINGLE BREATH LLN: 23.55
DLCOC SINGLE BREATH REF: 30.48
DLCOVA LLN: 3.16
DLCOVA PRE REF: 126.3 %
DLCOVA PRE: 5.56 ML/(MIN*MMHG*L) (ref 3.16–5.65)
DLCOVA REF: 4.4
ERV LLN: -16448.69
ERV PRE REF: 80.6 %
ERV REF: 1.31
FEF 25 75 CHG: 3.6 %
FEF 25 75 LLN: 1.47
FEF 25 75 POST REF: 120.6 %
FEF 25 75 PRE REF: 116.4 %
FEF 25 75 REF: 3.07
FET100 CHG: 6.6 %
FEV1 CHG: -1.9 %
FEV1 FVC CHG: 0.7 %
FEV1 FVC LLN: 69
FEV1 FVC POST REF: 106.3 %
FEV1 FVC PRE REF: 105.6 %
FEV1 FVC REF: 80
FEV1 LLN: 2.46
FEV1 POST REF: 86.5 %
FEV1 PRE REF: 88.2 %
FEV1 REF: 3.26
FRCPLETH LLN: 2.47
FRCPLETH PREREF: 71.7 %
FRCPLETH REF: 3.45
FVC CHG: -2.5 %
FVC LLN: 3.12
FVC POST REF: 81.3 %
FVC PRE REF: 83.3 %
FVC REF: 4.09
IVC PRE: 3.68 L (ref 3.12–5.06)
IVC SINGLE BREATH LLN: 3.12
IVC SINGLE BREATH PRE REF: 90.2 %
IVC SINGLE BREATH REF: 4.09
MVV LLN: 123
MVV PRE REF: 75.9 %
MVV REF: 145
PEF CHG: 10.9 %
PEF LLN: 6.04
PEF POST REF: 65.5 %
PEF PRE REF: 59.1 %
PEF REF: 8.62
POST FEF 25 75: 3.71 L/S (ref 1.47–5.26)
POST FET 100: 7.24 SEC
POST FEV1 FVC: 85.03 % (ref 69.46–89.07)
POST FEV1: 2.82 L (ref 2.46–4.02)
POST FVC: 3.32 L (ref 3.12–5.06)
POST PEF: 5.65 L/S (ref 6.04–11.2)
PRE DLCO: 26.73 ML/(MIN*MMHG) (ref 23.55–37.41)
PRE ERV: 1.05 L (ref -16448.69–16451.31)
PRE FEF 25 75: 3.58 L/S (ref 1.47–5.26)
PRE FET 100: 6.79 SEC
PRE FEV1 FVC: 84.48 % (ref 69.46–89.07)
PRE FEV1: 2.88 L (ref 2.46–4.02)
PRE FRC PL: 2.48 L (ref 2.47–4.44)
PRE FVC: 3.4 L (ref 3.12–5.06)
PRE MVV: 110.03 L/MIN (ref 123.26–166.76)
PRE PEF: 5.09 L/S (ref 6.04–11.2)
PRE RV: 1.42 L (ref 1.47–2.82)
PRE TLC: 5.08 L (ref 5.77–8.07)
RAW LLN: 3.06
RAW PRE REF: 85.7 %
RAW PRE: 2.62 CMH2O*S/L (ref 3.06–3.06)
RAW REF: 3.06
RV LLN: 1.47
RV PRE REF: 66.3 %
RV REF: 2.14
RVTLC LLN: 24
RVTLC PRE REF: 84.6 %
RVTLC PRE: 27.96 % (ref 24.09–42.05)
RVTLC REF: 33
TLC LLN: 5.77
TLC PRE REF: 73.4 %
TLC REF: 6.92
VA PRE: 4.81 L (ref 6.77–6.77)
VA SINGLE BREATH LLN: 6.77
VA SINGLE BREATH PRE REF: 71 %
VA SINGLE BREATH REF: 6.77
VC LLN: 3.12
VC PRE REF: 89.6 %
VC PRE: 3.66 L (ref 3.12–5.06)
VC REF: 4.09

## 2022-09-02 PROCEDURE — 94640 AIRWAY INHALATION TREATMENT: CPT | Mod: 59

## 2022-09-02 PROCEDURE — 94060 EVALUATION OF WHEEZING: CPT

## 2022-09-02 PROCEDURE — 94729 DIFFUSING CAPACITY: CPT

## 2022-09-02 PROCEDURE — 94726 PLETHYSMOGRAPHY LUNG VOLUMES: CPT

## 2022-09-02 RX ORDER — ALBUTEROL SULFATE 0.83 MG/ML
2.5 SOLUTION RESPIRATORY (INHALATION)
Status: COMPLETED | OUTPATIENT
Start: 2022-09-02 | End: 2022-09-02

## 2022-09-02 RX ORDER — IPRATROPIUM BROMIDE 0.5 MG/2.5ML
0.5 SOLUTION RESPIRATORY (INHALATION)
Status: COMPLETED | OUTPATIENT
Start: 2022-09-02 | End: 2022-09-02

## 2022-09-02 RX ADMIN — IPRATROPIUM BROMIDE 0.5 MG: 0.5 SOLUTION RESPIRATORY (INHALATION) at 01:09

## 2022-09-02 RX ADMIN — ALBUTEROL SULFATE 2.5 MG: 0.83 SOLUTION RESPIRATORY (INHALATION) at 01:09

## 2022-10-28 ENCOUNTER — HOSPITAL ENCOUNTER (EMERGENCY)
Facility: HOSPITAL | Age: 47
Discharge: HOME | End: 2022-10-28
Attending: FAMILY MEDICINE
Payer: MEDICAID

## 2022-10-28 VITALS
HEIGHT: 69 IN | DIASTOLIC BLOOD PRESSURE: 103 MMHG | RESPIRATION RATE: 20 BRPM | BODY MASS INDEX: 28.73 KG/M2 | HEART RATE: 87 BPM | OXYGEN SATURATION: 97 % | WEIGHT: 194 LBS | SYSTOLIC BLOOD PRESSURE: 152 MMHG | TEMPERATURE: 99 F

## 2022-10-28 DIAGNOSIS — K04.7 DENTAL ABSCESS: Primary | ICD-10-CM

## 2022-10-28 PROCEDURE — 99284 EMERGENCY DEPT VISIT MOD MDM: CPT | Mod: 25

## 2022-10-28 RX ORDER — AMOXICILLIN 500 MG/1
500 TABLET, FILM COATED ORAL EVERY 12 HOURS
Qty: 20 TABLET | Refills: 0 | Status: SHIPPED | OUTPATIENT
Start: 2022-10-28 | End: 2022-10-28 | Stop reason: SDUPTHER

## 2022-10-28 RX ORDER — IBUPROFEN 800 MG/1
800 TABLET ORAL 3 TIMES DAILY PRN
Qty: 15 TABLET | Refills: 0 | Status: SHIPPED | OUTPATIENT
Start: 2022-10-28 | End: 2022-11-02

## 2022-10-28 RX ORDER — AMOXICILLIN 500 MG/1
500 TABLET, FILM COATED ORAL EVERY 12 HOURS
Qty: 20 TABLET | Refills: 0 | Status: SHIPPED | OUTPATIENT
Start: 2022-10-28 | End: 2022-11-07

## 2022-10-28 RX ORDER — HYDROCODONE BITARTRATE AND ACETAMINOPHEN 7.5; 325 MG/1; MG/1
1 TABLET ORAL EVERY 6 HOURS PRN
Qty: 12 TABLET | Refills: 0 | Status: SHIPPED | OUTPATIENT
Start: 2022-10-28 | End: 2022-10-31

## 2022-10-28 RX ORDER — CHLORHEXIDINE GLUCONATE ORAL RINSE 1.2 MG/ML
15 SOLUTION DENTAL 2 TIMES DAILY
Qty: 300 ML | Refills: 0 | Status: SHIPPED | OUTPATIENT
Start: 2022-10-28 | End: 2022-11-07

## 2022-10-29 NOTE — ED NOTES
"Pt c/o dental pain to right lower jaw, unrelieved by OTC medication, Pt states " I cant take the pain anymore" Pt rates pain as a 10/10.   "

## 2022-10-29 NOTE — ED PROVIDER NOTES
Encounter Date: 10/28/2022       History     Chief Complaint   Patient presents with    Dental Pain     Dental pain all week-has been taking aspirin and ibuprofen-has dentist appt in 2 weeks-pain right bottom     47-year-old male presents with nontraumatic right lower dental pain for the past 2-3 days.  Patient denies fever or drainage.  Tells me he has follow-up with [facility] dental clinic next week.  No other complaints.    Review of patient's allergies indicates:   Allergen Reactions    Iodine Nausea And Vomiting     Other reaction(s): Hives     Past Medical History:   Diagnosis Date    Diabetes mellitus     GERD (gastroesophageal reflux disease)     Hypertension      No past surgical history on file.  History reviewed. No pertinent family history.     Review of Systems   Constitutional: Negative.    HENT:  Positive for dental problem.    Eyes: Negative.    Respiratory: Negative.     Cardiovascular: Negative.    Gastrointestinal: Negative.    Endocrine: Negative.    Genitourinary: Negative.    Musculoskeletal: Negative.    Skin: Negative.    Allergic/Immunologic: Negative.    Neurological: Negative.    Hematological: Negative.    Psychiatric/Behavioral: Negative.       Physical Exam     Initial Vitals [10/28/22 1903]   BP Pulse Resp Temp SpO2   (!) 152/103 87 20 99 °F (37.2 °C) 97 %      MAP       --         Physical Exam    Nursing note and vitals reviewed.  Constitutional: He appears well-developed and well-nourished.   HENT:   Head: Normocephalic and atraumatic.   Right lower premolar/molar-fractured tooth with exposed nerve root.  Moderate erythema at the gumline.  No fluctuance or drainage.   Eyes: Conjunctivae and EOM are normal. Pupils are equal, round, and reactive to light.   Neck: Neck supple.   Normal range of motion.  Cardiovascular:  Normal rate and regular rhythm.           Pulmonary/Chest: Breath sounds normal.   Abdominal: Abdomen is soft. Bowel sounds are normal.   Musculoskeletal:          General: Normal range of motion.      Cervical back: Normal range of motion and neck supple.     Neurological: He is alert and oriented to person, place, and time. He has normal strength. GCS score is 15. GCS eye subscore is 4. GCS verbal subscore is 5. GCS motor subscore is 6.   Skin: Skin is warm. Capillary refill takes less than 2 seconds.   Psychiatric: He has a normal mood and affect.       ED Course   Procedures  Labs Reviewed - No data to display       Imaging Results    None          Medications - No data to display  Medical Decision Making:   History:   Old Records Summarized: records from previous admission(s) and records from clinic visits.                        Clinical Impression:   Final diagnoses:  [K04.7] Dental abscess (Primary)        ED Disposition Condition    Discharge Stable          ED Prescriptions       Medication Sig Dispense Start Date End Date Auth. Provider    HYDROcodone-acetaminophen (NORCO) 7.5-325 mg per tablet Take 1 tablet by mouth every 6 (six) hours as needed for Pain. 12 tablet 10/28/2022 10/31/2022 Tapan Meade MD    ibuprofen (ADVIL,MOTRIN) 800 MG tablet Take 1 tablet (800 mg total) by mouth 3 (three) times daily as needed for Pain. 15 tablet 10/28/2022 11/2/2022 Tapan Meade MD    amoxicillin (AMOXIL) 500 MG Tab  (Status: Discontinued) Take 1 tablet (500 mg total) by mouth every 12 (twelve) hours. for 10 days 20 tablet 10/28/2022 10/28/2022 Tapan Meade MD    chlorhexidine (PERIDEX) 0.12 % solution Use as directed 15 mLs in the mouth or throat 2 (two) times daily. for 10 days 300 mL 10/28/2022 11/7/2022 Tapan Meade MD    amoxicillin (AMOXIL) 500 MG Tab Take 1 tablet (500 mg total) by mouth every 12 (twelve) hours. for 10 days 20 tablet 10/28/2022 11/7/2022 Tapna Meade MD          Follow-up Information       Follow up With Specialties Details Why Contact Info    Jatin August NP Family Medicine   [address] [place] [address] [address] [place]  [id number]  [phone]      Dentist  Go to                Tapan Meade MD  10/28/22 1929

## 2022-12-08 ENCOUNTER — HOSPITAL ENCOUNTER (EMERGENCY)
Facility: HOSPITAL | Age: 47
Discharge: HOME | End: 2022-12-08
Attending: STUDENT IN AN ORGANIZED HEALTH CARE EDUCATION/TRAINING PROGRAM
Payer: MEDICAID

## 2022-12-08 VITALS
RESPIRATION RATE: 16 BRPM | HEART RATE: 68 BPM | BODY MASS INDEX: 29.33 KG/M2 | OXYGEN SATURATION: 99 % | DIASTOLIC BLOOD PRESSURE: 84 MMHG | SYSTOLIC BLOOD PRESSURE: 142 MMHG | WEIGHT: 198 LBS | TEMPERATURE: 99 F | HEIGHT: 69 IN

## 2022-12-08 DIAGNOSIS — S20.211A CHEST WALL CONTUSION, RIGHT, INITIAL ENCOUNTER: Primary | ICD-10-CM

## 2022-12-08 LAB
ALBUMIN SERPL-MCNC: 4 GM/DL (ref 3.5–5)
ALBUMIN/GLOB SERPL: 1.2 RATIO (ref 1.1–2)
ALP SERPL-CCNC: 73 UNIT/L (ref 40–150)
ALT SERPL-CCNC: 11 UNIT/L (ref 0–55)
AST SERPL-CCNC: 11 UNIT/L (ref 5–34)
BASOPHILS # BLD AUTO: 0.04 X10(3)/MCL (ref 0–0.2)
BASOPHILS NFR BLD AUTO: 0.8 %
BILIRUBIN DIRECT+TOT PNL SERPL-MCNC: 0.6 MG/DL
BUN SERPL-MCNC: 9 MG/DL (ref 8.9–20.6)
CALCIUM SERPL-MCNC: 9.4 MG/DL (ref 8.4–10.2)
CHLORIDE SERPL-SCNC: 106 MMOL/L (ref 98–107)
CO2 SERPL-SCNC: 25 MMOL/L (ref 22–29)
CREAT SERPL-MCNC: 1.33 MG/DL (ref 0.73–1.18)
EOSINOPHIL # BLD AUTO: 0.11 X10(3)/MCL (ref 0–0.9)
EOSINOPHIL NFR BLD AUTO: 2.3 %
ERYTHROCYTE [DISTWIDTH] IN BLOOD BY AUTOMATED COUNT: 14 % (ref 11.5–17)
GFR SERPLBLD CREATININE-BSD FMLA CKD-EPI: >60 MLS/MIN/1.73/M2
GLOBULIN SER-MCNC: 3.4 GM/DL (ref 2.4–3.5)
GLUCOSE SERPL-MCNC: 135 MG/DL (ref 74–100)
HCT VFR BLD AUTO: 49.1 % (ref 42–52)
HGB BLD-MCNC: 15.8 GM/DL (ref 14–18)
IMM GRANULOCYTES # BLD AUTO: 0 X10(3)/MCL (ref 0–0.04)
IMM GRANULOCYTES NFR BLD AUTO: 0 %
LYMPHOCYTES # BLD AUTO: 2.25 X10(3)/MCL (ref 0.6–4.6)
LYMPHOCYTES NFR BLD AUTO: 47.8 %
MCH RBC QN AUTO: 28.2 PG (ref 27–31)
MCHC RBC AUTO-ENTMCNC: 32.2 MG/DL (ref 33–36)
MCV RBC AUTO: 87.7 FL (ref 80–94)
MONOCYTES # BLD AUTO: 0.35 X10(3)/MCL (ref 0.1–1.3)
MONOCYTES NFR BLD AUTO: 7.4 %
NEUTROPHILS # BLD AUTO: 2 X10(3)/MCL (ref 2.1–9.2)
NEUTROPHILS NFR BLD AUTO: 41.7 %
NRBC BLD AUTO-RTO: 0 %
PLATELET # BLD AUTO: 215 X10(3)/MCL (ref 130–400)
PMV BLD AUTO: 10.3 FL (ref 7.4–10.4)
POTASSIUM SERPL-SCNC: 3.9 MMOL/L (ref 3.5–5.1)
PROT SERPL-MCNC: 7.4 GM/DL (ref 6.4–8.3)
RBC # BLD AUTO: 5.6 X10(6)/MCL (ref 4.7–6.1)
SODIUM SERPL-SCNC: 140 MMOL/L (ref 136–145)
WBC # SPEC AUTO: 4.7 X10(3)/MCL (ref 4.5–11.5)

## 2022-12-08 PROCEDURE — 99284 EMERGENCY DEPT VISIT MOD MDM: CPT | Mod: 25

## 2022-12-08 PROCEDURE — 96360 HYDRATION IV INFUSION INIT: CPT

## 2022-12-08 PROCEDURE — 63600175 PHARM REV CODE 636 W HCPCS: Performed by: STUDENT IN AN ORGANIZED HEALTH CARE EDUCATION/TRAINING PROGRAM

## 2022-12-08 PROCEDURE — 85025 COMPLETE CBC W/AUTO DIFF WBC: CPT | Performed by: STUDENT IN AN ORGANIZED HEALTH CARE EDUCATION/TRAINING PROGRAM

## 2022-12-08 PROCEDURE — 80053 COMPREHEN METABOLIC PANEL: CPT | Performed by: STUDENT IN AN ORGANIZED HEALTH CARE EDUCATION/TRAINING PROGRAM

## 2022-12-08 RX ORDER — HYDROCODONE BITARTRATE AND ACETAMINOPHEN 5; 325 MG/1; MG/1
1 TABLET ORAL EVERY 6 HOURS PRN
Qty: 12 TABLET | Refills: 0 | Status: SHIPPED | OUTPATIENT
Start: 2022-12-08 | End: 2022-12-11

## 2022-12-08 RX ADMIN — SODIUM CHLORIDE, POTASSIUM CHLORIDE, SODIUM LACTATE AND CALCIUM CHLORIDE 1000 ML: 600; 310; 30; 20 INJECTION, SOLUTION INTRAVENOUS at 07:12

## 2022-12-09 NOTE — ED PROVIDER NOTES
"Encounter Date: 12/8/2022       History     Chief Complaint   Patient presents with    Fall     "I tripped and fell this morning while taking my dog out.Think I broke a rib."Also  wants to check for dehydration     HPI    48 yo old male with past medical history of hypertension diabetes and GERD presents emergency department for right-sided chest pain after he sustained a fall this morning.  Patient states he was taking his dogs out this morning when he tripped over 1 of the dogs landing on the concrete sidewalk.  States that every time he takes a deep breath, laughs or moves he has severe pain to the right side of his chest wall.  He is concerned he broke a rib.  He also feels like he may be dehydrated as he is taking care of his elderly sickly mother and has not been taking care of herself.  States he finds his urine is dark and wants to make sure that his kidney function is okay.  Denies any chest pain prior to the fall.  No shortness of breath.  Denies any head injury or loss of consciousness.  No joint pain.    Review of patient's allergies indicates:   Allergen Reactions    Iodine Nausea And Vomiting     Other reaction(s): Hives     Past Medical History:   Diagnosis Date    Diabetes mellitus     GERD (gastroesophageal reflux disease)     Hypertension      History reviewed. No pertinent surgical history.  History reviewed. No pertinent family history.     Review of Systems   Constitutional:  Negative for fever.   Respiratory:  Negative for shortness of breath.    Cardiovascular:  Positive for chest pain.   Gastrointestinal:  Negative for abdominal pain.   Musculoskeletal:  Negative for back pain and joint swelling.   All other systems reviewed and are negative.    Physical Exam     Initial Vitals [12/08/22 1908]   BP Pulse Resp Temp SpO2   (!) 149/93 74 20 98.7 °F (37.1 °C) 98 %      MAP       --         Physical Exam    Nursing note and vitals reviewed.  Constitutional: He appears well-developed and " well-nourished. No distress.   Cardiovascular:  Normal rate and regular rhythm.           Pulmonary/Chest: Breath sounds normal. No respiratory distress. He exhibits tenderness (Chest wall tenderness to the right lower aspect of the anterior chest wall.  No contusions appreciated.).   Abdominal: Abdomen is soft. Bowel sounds are normal. There is no abdominal tenderness.   Musculoskeletal:         General: No tenderness. Normal range of motion.     Neurological: He is alert and oriented to person, place, and time.   Skin: Skin is warm. Capillary refill takes less than 2 seconds.   Psychiatric: He has a normal mood and affect. Thought content normal.       ED Course   Procedures  Labs Reviewed   COMPREHENSIVE METABOLIC PANEL - Abnormal; Notable for the following components:       Result Value    Glucose Level 135 (*)     Creatinine 1.33 (*)     All other components within normal limits   CBC WITH DIFFERENTIAL - Abnormal; Notable for the following components:    MCHC 32.2 (*)     Neut # 2.0 (*)     All other components within normal limits   CBC W/ AUTO DIFFERENTIAL    Narrative:     The following orders were created for panel order CBC auto differential.  Procedure                               Abnormality         Status                     ---------                               -----------         ------                     CBC with Differential[[id number]]        Abnormal            Final result                 Please view results for these tests on the individual orders.          Imaging Results              CT Chest Without Contrast (Final result)  Result time 12/08/22 20:12:11      Final result by Damaso Echevarria MD (12/08/22 20:12:11)                   Impression:      No acute abnormality of the chest within the limits of noncontrast exam.  1 cm inflammatory appearing lymph node within the right axillary region.      Electronically signed by: Damaso Echevarria  Date:    12/08/2022  Time:    20:12                Narrative:    EXAMINATION:  CT CHEST WITHOUT CONTRAST    CLINICAL HISTORY:  Chest trauma, blunt;    TECHNIQUE:  Axial images of the chest were obtained without contrast. Sagittal and coronal reconstructed images were available for review.    Automatic dose control was utilized to reduce patient radiation dose.    DLP = 202    COMPARISON:  No prior imaging available for comparison.    FINDINGS:  AORTA: Limited evaluation secondary to lack of IV contrast.  Grossly normal in course and caliber.    THYROID GLAND: The visualized thyroid is unremarkable.    AIRWAYS: Trachea is midline and tracheobronchial tree is patent.    HEART: The heart is normal in size. There is no pericardial effusion.    LUNGS: There are no new masses or nodules identified. No pleural effusion or pneumothorax.    LYMPH NODES: 1 cm inflammatory appearing lymph node within the right axillary region.    BONES: No displaced fracture. No aggressive appearing osseous lesion identified.    UPPER ABDOMEN:  The visualized abdomen is unremarkable.                                         Medications   lactated ringers bolus 1,000 mL (1,000 mLs Intravenous New Bag 12/8/22 1947)     Medical Decision Making:   Differential Diagnosis:   Chest wall contusion, rib fracture, dehydration           ED Course as of 12/08/22 2027   Thu Dec 08, 2022   2026 CT negative.  Laboratory results all within normal limits.  Will discharge.  ER precautions given. [BS]      ED Course User Index  [BS] Maurizio Samano MD                 Clinical Impression:   Final diagnoses:  [S20.211A] Chest wall contusion, right, initial encounter (Primary)      ED Disposition Condition    Discharge Stable          ED Prescriptions       Medication Sig Dispense Start Date End Date Auth. Provider    HYDROcodone-acetaminophen (NORCO) 5-325 mg per tablet Take 1 tablet by mouth every 6 (six) hours as needed for Pain. 12 tablet 12/8/2022 12/11/2022 Maurizio Samano MD          Follow-up  Information       Follow up With Specialties Details Why Contact Info    Jatin August, [facility] Schedule an appointment as soon as possible for a visit   [address] [place] [address] [address] [place] [address]  [phone]      [facility] - Emergency Dept Emergency Medicine Go to  If symptoms worsen [address]  [facility] [id number]  [phone]             Maurizio Samano MD  12/08/22 2027

## 2023-01-11 ENCOUNTER — HOSPITAL ENCOUNTER (EMERGENCY)
Facility: HOSPITAL | Age: 48
Discharge: HOME | End: 2023-01-11
Attending: EMERGENCY MEDICINE
Payer: MEDICAID

## 2023-01-11 VITALS
RESPIRATION RATE: 20 BRPM | TEMPERATURE: 98 F | DIASTOLIC BLOOD PRESSURE: 83 MMHG | HEART RATE: 75 BPM | SYSTOLIC BLOOD PRESSURE: 151 MMHG | BODY MASS INDEX: 25.18 KG/M2 | WEIGHT: 170 LBS | HEIGHT: 69 IN | OXYGEN SATURATION: 97 %

## 2023-01-11 DIAGNOSIS — K29.00 ACUTE SUPERFICIAL GASTRITIS WITHOUT HEMORRHAGE: Primary | ICD-10-CM

## 2023-01-11 DIAGNOSIS — K59.00 CONSTIPATION, UNSPECIFIED CONSTIPATION TYPE: ICD-10-CM

## 2023-01-11 DIAGNOSIS — R10.10 PAIN OF UPPER ABDOMEN: ICD-10-CM

## 2023-01-11 DIAGNOSIS — R10.9 ABDOMINAL PAIN: ICD-10-CM

## 2023-01-11 LAB
ALBUMIN SERPL-MCNC: 3.6 G/DL (ref 3.5–5)
ALBUMIN/GLOB SERPL: 1.1 RATIO (ref 1.1–2)
ALP SERPL-CCNC: 68 UNIT/L (ref 40–150)
ALT SERPL-CCNC: 12 UNIT/L (ref 0–55)
APPEARANCE UR: CLEAR
AST SERPL-CCNC: 11 UNIT/L (ref 5–34)
BASOPHILS # BLD AUTO: 0.02 X10(3)/MCL (ref 0–0.2)
BASOPHILS NFR BLD AUTO: 0.4 %
BILIRUB UR QL STRIP.AUTO: NEGATIVE MG/DL
BILIRUBIN DIRECT+TOT PNL SERPL-MCNC: 0.4 MG/DL
BUN SERPL-MCNC: 9.2 MG/DL (ref 8.9–20.6)
CALCIUM SERPL-MCNC: 9.2 MG/DL (ref 8.4–10.2)
CHLORIDE SERPL-SCNC: 102 MMOL/L (ref 98–107)
CO2 SERPL-SCNC: 26 MMOL/L (ref 22–29)
COLOR UR AUTO: YELLOW
CREAT SERPL-MCNC: 1.21 MG/DL (ref 0.73–1.18)
EOSINOPHIL # BLD AUTO: 0.07 X10(3)/MCL (ref 0–0.9)
EOSINOPHIL NFR BLD AUTO: 1.3 %
ERYTHROCYTE [DISTWIDTH] IN BLOOD BY AUTOMATED COUNT: 12.9 % (ref 11.5–17)
GFR SERPLBLD CREATININE-BSD FMLA CKD-EPI: >60 MLS/MIN/1.73/M2
GLOBULIN SER-MCNC: 3.3 GM/DL (ref 2.4–3.5)
GLUCOSE SERPL-MCNC: 255 MG/DL (ref 74–100)
GLUCOSE UR QL STRIP.AUTO: NEGATIVE MG/DL
HCT VFR BLD AUTO: 45.3 % (ref 42–52)
HGB BLD-MCNC: 14.8 GM/DL (ref 14–18)
IMM GRANULOCYTES # BLD AUTO: 0.02 X10(3)/MCL (ref 0–0.04)
IMM GRANULOCYTES NFR BLD AUTO: 0.4 %
KETONES UR QL STRIP.AUTO: NEGATIVE MG/DL
LEUKOCYTE ESTERASE UR QL STRIP.AUTO: NEGATIVE UNIT/L
LIPASE SERPL-CCNC: 45 U/L
LYMPHOCYTES # BLD AUTO: 1.55 X10(3)/MCL (ref 0.6–4.6)
LYMPHOCYTES NFR BLD AUTO: 28.2 %
MCH RBC QN AUTO: 28.1 PG
MCHC RBC AUTO-ENTMCNC: 32.7 MG/DL (ref 33–36)
MCV RBC AUTO: 86.1 FL (ref 80–94)
MONOCYTES # BLD AUTO: 0.36 X10(3)/MCL (ref 0.1–1.3)
MONOCYTES NFR BLD AUTO: 6.5 %
NEUTROPHILS # BLD AUTO: 3.48 X10(3)/MCL (ref 2.1–9.2)
NEUTROPHILS NFR BLD AUTO: 63.2 %
NITRITE UR QL STRIP.AUTO: NEGATIVE
NRBC BLD AUTO-RTO: 0 %
PH UR STRIP.AUTO: 6.5 [PH]
PLATELET # BLD AUTO: 235 X10(3)/MCL (ref 130–400)
PMV BLD AUTO: 9.8 FL (ref 7.4–10.4)
POTASSIUM SERPL-SCNC: 3 MMOL/L (ref 3.5–5.1)
PROT SERPL-MCNC: 6.9 GM/DL (ref 6.4–8.3)
PROT UR QL STRIP.AUTO: NEGATIVE MG/DL
RBC # BLD AUTO: 5.26 X10(6)/MCL (ref 4.7–6.1)
RBC UR QL AUTO: NEGATIVE UNIT/L
SODIUM SERPL-SCNC: 138 MMOL/L (ref 136–145)
SP GR UR STRIP.AUTO: <=1.005
UROBILINOGEN UR STRIP-ACNC: 0.2 MG/DL
WBC # SPEC AUTO: 5.5 X10(3)/MCL (ref 4.5–11.5)

## 2023-01-11 PROCEDURE — 96374 THER/PROPH/DIAG INJ IV PUSH: CPT

## 2023-01-11 PROCEDURE — 80053 COMPREHEN METABOLIC PANEL: CPT | Performed by: EMERGENCY MEDICINE

## 2023-01-11 PROCEDURE — 85025 COMPLETE CBC W/AUTO DIFF WBC: CPT | Performed by: EMERGENCY MEDICINE

## 2023-01-11 PROCEDURE — 96361 HYDRATE IV INFUSION ADD-ON: CPT

## 2023-01-11 PROCEDURE — 83690 ASSAY OF LIPASE: CPT | Performed by: EMERGENCY MEDICINE

## 2023-01-11 PROCEDURE — 63600175 PHARM REV CODE 636 W HCPCS: Performed by: EMERGENCY MEDICINE

## 2023-01-11 PROCEDURE — 25000003 PHARM REV CODE 250: Performed by: EMERGENCY MEDICINE

## 2023-01-11 PROCEDURE — 81003 URINALYSIS AUTO W/O SCOPE: CPT | Performed by: EMERGENCY MEDICINE

## 2023-01-11 PROCEDURE — 99285 EMERGENCY DEPT VISIT HI MDM: CPT | Mod: 25

## 2023-01-11 RX ORDER — LIDOCAINE HYDROCHLORIDE 20 MG/ML
15 SOLUTION OROPHARYNGEAL ONCE
Status: COMPLETED | OUTPATIENT
Start: 2023-01-11 | End: 2023-01-11

## 2023-01-11 RX ORDER — MAG HYDROX/ALUMINUM HYD/SIMETH 200-200-20
30 SUSPENSION, ORAL (FINAL DOSE FORM) ORAL ONCE
Status: COMPLETED | OUTPATIENT
Start: 2023-01-11 | End: 2023-01-11

## 2023-01-11 RX ORDER — ONDANSETRON 2 MG/ML
4 INJECTION INTRAMUSCULAR; INTRAVENOUS
Status: COMPLETED | OUTPATIENT
Start: 2023-01-11 | End: 2023-01-11

## 2023-01-11 RX ORDER — PANTOPRAZOLE SODIUM 40 MG/1
40 TABLET, DELAYED RELEASE ORAL DAILY
Qty: 21 TABLET | Refills: 0 | Status: SHIPPED | OUTPATIENT
Start: 2023-01-11 | End: 2023-04-23

## 2023-01-11 RX ADMIN — ONDANSETRON 4 MG: 2 INJECTION INTRAMUSCULAR; INTRAVENOUS at 02:01

## 2023-01-11 RX ADMIN — LIDOCAINE HYDROCHLORIDE 15 ML: 20 SOLUTION ORAL at 02:01

## 2023-01-11 RX ADMIN — Medication 1 ENEMA: at 02:01

## 2023-01-11 RX ADMIN — SODIUM CHLORIDE 1000 ML: 9 INJECTION, SOLUTION INTRAVENOUS at 02:01

## 2023-01-11 RX ADMIN — ALUMINUM HYDROXIDE, MAGNESIUM HYDROXIDE, AND SIMETHICONE 30 ML: 200; 200; 20 SUSPENSION ORAL at 02:01

## 2023-01-11 NOTE — ED PROVIDER NOTES
Encounter Date: 1/11/2023       History     Chief Complaint   Patient presents with    Abdominal Pain    Vomiting     47-year-old male with a history of hypertension diabetes and mild chronic renal insufficiency presents to the emergency room stating he has had constant left upper quadrant abdominal pain for the past 3 days.  He states he is unable to tolerate p.o. because it causes him to vomit.  He last vomited January 10th at 8:00 a.m..  He states he has had only very small volume hard stools for the past 3 days with his last normal bowel movement being 4 days ago.  He is having no fevers chills sweats chest pain cough cold rhinorrhea sore throat nor back pain.  He states he has had problems like this in the past when he was constipated and states he was put on Protonix.  He has not tried taking any laxatives.    Review of patient's allergies indicates:   Allergen Reactions    Iodine Nausea And Vomiting     Other reaction(s): Hives     Past Medical History:   Diagnosis Date    Diabetes mellitus     GERD (gastroesophageal reflux disease)     Hypertension      No past surgical history on file.  No family history on file.     Review of Systems   Constitutional:  Negative for fever.   HENT:  Negative for sore throat.    Respiratory:  Negative for shortness of breath.    Cardiovascular:  Negative for chest pain.   Gastrointestinal:  Positive for abdominal pain and nausea.   Genitourinary:  Negative for dysuria.   Musculoskeletal:  Negative for back pain.   Skin:  Negative for rash.   Neurological:  Negative for weakness.   Hematological:  Does not bruise/bleed easily.     Physical Exam     Initial Vitals [01/11/23 0117]   BP Pulse Resp Temp SpO2   (!) 144/84 90 18 98.4 °F (36.9 °C) 100 %      MAP       --         Physical Exam    Nursing note and vitals reviewed.  Constitutional: He appears well-developed.   HENT:   Head: Normocephalic.   Eyes: Conjunctivae are normal.   Cardiovascular:  Normal rate, regular rhythm  and normal heart sounds.           Pulmonary/Chest: Breath sounds normal.   Abdominal: Abdomen is soft. Bowel sounds are normal. He exhibits no distension. There is abdominal tenderness.   There is mild tenderness to the left upper quadrant and left lower quadrant.  No epigastric right upper quadrant nor right lower quadrant tenderness.  There is no guarding no rebound.  No abnormal bowel sounds.   Musculoskeletal:         General: No edema.     Lymphadenopathy:     He has no cervical adenopathy.   Neurological: He is alert.   Skin: Skin is warm.   Psychiatric: He has a normal mood and affect.       ED Course   Procedures  Labs Reviewed   COMPREHENSIVE METABOLIC PANEL - Abnormal; Notable for the following components:       Result Value    Potassium Level 3.0 (*)     Glucose Level 255 (*)     Creatinine 1.21 (*)     All other components within normal limits   CBC WITH DIFFERENTIAL - Abnormal; Notable for the following components:    MCHC 32.7 (*)     All other components within normal limits   LIPASE - Normal   CBC W/ AUTO DIFFERENTIAL    Narrative:     The following orders were created for panel order CBC auto differential.  Procedure                               Abnormality         Status                     ---------                               -----------         ------                     CBC with Differential[[id number]]        Abnormal            Final result                 Please view results for these tests on the individual orders.   URINALYSIS, REFLEX TO URINE CULTURE          Imaging Results              CT Abdomen Pelvis  Without Contrast (Preliminary result)  Result time 01/11/23 04:09:14   Procedure changed from CT Abdomen Pelvis With Contrast     Preliminary result by Jose Puckett Jr., MD (01/11/23 04:09:14)                   Narrative:    START OF REPORT:  TECHNIQUE: CT OF THE ABDOMEN AND PELVIS WAS PERFORMED WITH AXIAL IMAGES AS WELL AS SAGITTAL AND CORONAL RECONSTRUCTION IMAGES WITHOUT  INTRAVENOUS CONTRAST RENAL STONE PROTOCOL.    COMPARISON: NONE AVAILABLE.    CLINICAL HISTORY: ABDOMINAL PAIN.    DOSAGE INFORMATION: AUTOMATED EXPOSURE CONTROL WAS UTILIZED.    Findings:  Lines and Tubes: None.  Thorax:  Lungs: The visualized lung bases appear unremarkable.  Pleura: No effusions or thickening are seen. No pneumothorax is seen in the visualized lung bases.  Heart: The heart size is within normal limits.  Abdomen:  Abdominal Wall: No abdominal wall pathology is seen.  Liver: The liver appears unremarkable.  Biliary System: No intrahepatic or extrahepatic biliary duct dilatation is seen.  Gallbladder: The gallbladder appears unremarkable.  Pancreas: The pancreas appears unremarkable.  Spleen: The spleen appears unremarkable.  Adrenals: The adrenal glands appear unremarkable.  Kidneys: The right kidney appears unremarkable with no stones cysts masses or hydronephrosis. Note of tiny scattered calcifications on the left which may reflect tiny nonobstructive stones or vascular calcifications. The left kidney otherwise appears unremarkable with no cysts masses or hydronephrosis identified.  Aorta: There is mild calcification of the abdominal aorta and its branches.  IVC: Unremarkable.  Bowel:  Esophagus: The visualized esophagus appears unremarkable.  Stomach: The stomach appears unremarkable.  Duodenum: Unremarkable appearing duodenum.  Small Bowel: The small bowel appears unremarkable.  Colon: Nondistended. There is moderate stool in the colon which could reflect an element of constipation.  Appendix: The appendix appears unremarkable and is seen on âImage 64, Series 2â.  Peritoneum: No intraperitoneal free air or ascites is seen.    Pelvis:  Bladder: The bladder wall appears thickened after considering state of distension which could reflect an element of cystitis.  Male: Non specific calcification of the seminal ducts.    Bony structures:  Dorsal Spine: The visualized dorsal spine appears  unremarkable.  Bony Pelvis: The visualized bony structures of the pelvis appear unremarkable.      Impression:  1. The bladder wall appears thickened after considering state of distension which could reflect an element of cystitis. Correlate with clinical and laboratory findings.  2. There is moderate stool in the colon which could reflect an element of constipation.  3. Details and other findings as discussed above.                          Preliminary result by Interface, Rad Results In (01/11/23 04:09:14)                   Narrative:    START OF REPORT:  TECHNIQUE: CT OF THE ABDOMEN AND PELVIS WAS PERFORMED WITH AXIAL IMAGES AS WELL AS SAGITTAL AND CORONAL RECONSTRUCTION IMAGES WITHOUT INTRAVENOUS CONTRAST RENAL STONE PROTOCOL.    COMPARISON: NONE AVAILABLE.    CLINICAL HISTORY: ABDOMINAL PAIN.    DOSAGE INFORMATION: AUTOMATED EXPOSURE CONTROL WAS UTILIZED.    Findings:  Lines and Tubes: None.  Thorax:  Lungs: The visualized lung bases appear unremarkable.  Pleura: No effusions or thickening are seen. No pneumothorax is seen in the visualized lung bases.  Heart: The heart size is within normal limits.  Abdomen:  Abdominal Wall: No abdominal wall pathology is seen.  Liver: The liver appears unremarkable.  Biliary System: No intrahepatic or extrahepatic biliary duct dilatation is seen.  Gallbladder: The gallbladder appears unremarkable.  Pancreas: The pancreas appears unremarkable.  Spleen: The spleen appears unremarkable.  Adrenals: The adrenal glands appear unremarkable.  Kidneys: The right kidney appears unremarkable with no stones cysts masses or hydronephrosis. Note of tiny scattered calcifications on the left which may reflect tiny nonobstructive stones or vascular calcifications. The left kidney otherwise appears unremarkable with no cysts masses or hydronephrosis identified.  Aorta: There is mild calcification of the abdominal aorta and its branches.  IVC: Unremarkable.  Bowel:  Esophagus: The visualized  esophagus appears unremarkable.  Stomach: The stomach appears unremarkable.  Duodenum: Unremarkable appearing duodenum.  Small Bowel: The small bowel appears unremarkable.  Colon: Nondistended. There is moderate stool in the colon which could reflect an element of constipation.  Appendix: The appendix appears unremarkable and is seen on âImage 64, Series 2â.  Peritoneum: No intraperitoneal free air or ascites is seen.    Pelvis:  Bladder: The bladder wall appears thickened after considering state of distension which could reflect an element of cystitis.  Male: Non specific calcification of the seminal ducts.    Bony structures:  Dorsal Spine: The visualized dorsal spine appears unremarkable.  Bony Pelvis: The visualized bony structures of the pelvis appear unremarkable.      Impression:  1. The bladder wall appears thickened after considering state of distension which could reflect an element of cystitis. Correlate with clinical and laboratory findings.  2. There is moderate stool in the colon which could reflect an element of constipation.  3. Details and other findings as discussed above.                                         X-Ray Abdomen Flat And Erect (In process)  Result time 01/11/23 02:03:19                     Medications   ondansetron injection 4 mg (4 mg Intravenous Given 1/11/23 0203)   aluminum-magnesium hydroxide-simethicone 200-200-20 mg/5 mL suspension 30 mL (30 mLs Oral Given 1/11/23 0203)     And   LIDOcaine HCl 2% oral solution 15 mL (15 mLs Oral Given 1/11/23 0203)   sodium chloride 0.9% bolus 1,000 mL 1,000 mL (0 mLs Intravenous Stopped 1/11/23 0303)   sodium phosphates 19-7 gram/118 mL enema 1 enema (1 enema Rectal Given 1/11/23 0238)     Medical Decision Making:   Differential Diagnosis:   Gastritis, pancreatitis, diverticulitis, perforated ulcer, constipation  Clinical Tests:   Lab Tests: Reviewed  Radiological Study: Reviewed  ED Management:  MDM:  47-year-old presents with the acute  onset of moderate constipation and abdominal pain nausea and vomiting.  His female significant other is with him and provides some of the history including stating that he has not been able to keep anything down p.o..  Complicating past medical history includes diabetes hypertension and chronic renal insufficiency.  I reviewed his old records in the chart including labs showing  a creatinine/GFR greater than 60.  I am going to give him IV fluids, a Fleet enema, check his CBC and CMP for signs of dehydration/other abnormalities, and will check a Flattening reactive his abdomen.  He has nothing to suggest an acute abdomen at this point and I will not get a CT scan.  I will see how he responds to a GI cocktail that I have ordered.  Fleet enema did not result in a bowel movement.  I ordered a CT scan that shows, per radiologist reading the following:  The bladder wall appears thickened after considering state of distension which could reflect an element of cystitis correlate with clinical and laboratory findings.  There is moderate stool in the colon which could reflect an element of constipation.  No other acute findings.  I reviewed his urinalysis which does not show anything at all to suggest a urinary tract infection.  Re-evaluation of the patient at 3:44 a.m.:  Patient states he did have a bowel movement.  He states the GI cocktail completely resolved his left upper quadrant pain.  I will prescribe Nexium, magnesium citrate, and he is ready to go home.                        Clinical Impression:   Final diagnoses:  [R10.9] Abdominal pain  [K29.00] Acute superficial gastritis without hemorrhage (Primary)  [K59.00] Constipation, unspecified constipation type  [R10.10] Pain of upper abdomen        ED Disposition Condition    Discharge Stable          ED Prescriptions       Medication Sig Dispense Start Date End Date Auth. Provider    pantoprazole (PROTONIX) 40 MG tablet Take 1 tablet (40 mg total) by mouth once daily.  for 21 doses 21 tablet 1/11/2023 2/1/2023 Tevin Pérez Jr., MD          Follow-up Information       Follow up With Specialties Details Why Contact Info    pcp  In 2 days               Tevin Pérez Jr., MD  01/11/23 3270

## 2023-03-10 ENCOUNTER — HOSPITAL ENCOUNTER (EMERGENCY)
Facility: HOSPITAL | Age: 48
Discharge: LEFT BEFORE DISCHARGE | End: 2023-03-10
Attending: EMERGENCY MEDICINE
Payer: MEDICAID

## 2023-03-10 VITALS
TEMPERATURE: 98 F | OXYGEN SATURATION: 98 % | HEART RATE: 64 BPM | SYSTOLIC BLOOD PRESSURE: 189 MMHG | DIASTOLIC BLOOD PRESSURE: 99 MMHG | BODY MASS INDEX: 24.81 KG/M2 | WEIGHT: 168 LBS | RESPIRATION RATE: 20 BRPM

## 2023-03-10 DIAGNOSIS — R07.9 CHEST PAIN: ICD-10-CM

## 2023-03-10 DIAGNOSIS — R11.10 ACUTE VOMITING: Primary | ICD-10-CM

## 2023-03-10 DIAGNOSIS — I10 SEVERE HYPERTENSION: ICD-10-CM

## 2023-03-10 LAB
ALBUMIN SERPL-MCNC: 4.5 G/DL (ref 3.5–5)
ALBUMIN/GLOB SERPL: 1.2 RATIO (ref 1.1–2)
ALP SERPL-CCNC: 93 UNIT/L (ref 40–150)
ALT SERPL-CCNC: 15 UNIT/L (ref 0–55)
AMPHET UR QL SCN: NEGATIVE
APPEARANCE UR: CLEAR
AST SERPL-CCNC: 19 UNIT/L (ref 5–34)
BARBITURATE SCN PRESENT UR: NEGATIVE
BASOPHILS # BLD AUTO: 0.04 X10(3)/MCL (ref 0–0.2)
BASOPHILS NFR BLD AUTO: 0.5 %
BENZODIAZ UR QL SCN: POSITIVE
BILIRUB UR QL STRIP.AUTO: NEGATIVE MG/DL
BILIRUBIN DIRECT+TOT PNL SERPL-MCNC: 0.5 MG/DL
BNP BLD-MCNC: 58.3 PG/ML
BUN SERPL-MCNC: 11.3 MG/DL (ref 8.9–20.6)
CALCIUM SERPL-MCNC: 9.3 MG/DL (ref 8.4–10.2)
CANNABINOIDS UR QL SCN: NEGATIVE
CHLORIDE SERPL-SCNC: 105 MMOL/L (ref 98–107)
CO2 SERPL-SCNC: 24 MMOL/L (ref 22–29)
COCAINE UR QL SCN: NEGATIVE
COLOR UR AUTO: ABNORMAL
CREAT SERPL-MCNC: 1.19 MG/DL (ref 0.73–1.18)
EOSINOPHIL # BLD AUTO: 0.13 X10(3)/MCL (ref 0–0.9)
EOSINOPHIL NFR BLD AUTO: 1.7 %
ERYTHROCYTE [DISTWIDTH] IN BLOOD BY AUTOMATED COUNT: 14.8 % (ref 11.5–17)
ETHANOL SERPL-MCNC: 10 MG/DL
FENTANYL UR QL SCN: NEGATIVE
FLUAV AG UPPER RESP QL IA.RAPID: NOT DETECTED
FLUBV AG UPPER RESP QL IA.RAPID: NOT DETECTED
GFR SERPLBLD CREATININE-BSD FMLA CKD-EPI: >60 MLS/MIN/1.73/M2
GLOBULIN SER-MCNC: 3.7 GM/DL (ref 2.4–3.5)
GLUCOSE SERPL-MCNC: 169 MG/DL (ref 74–100)
GLUCOSE UR QL STRIP.AUTO: 100 MG/DL
HCT VFR BLD AUTO: 47.3 % (ref 42–52)
HGB BLD-MCNC: 15.5 G/DL (ref 14–18)
IMM GRANULOCYTES # BLD AUTO: 0.01 X10(3)/MCL (ref 0–0.04)
IMM GRANULOCYTES NFR BLD AUTO: 0.1 %
KETONES UR QL STRIP.AUTO: NEGATIVE MG/DL
LEUKOCYTE ESTERASE UR QL STRIP.AUTO: NEGATIVE UNIT/L
LIPASE SERPL-CCNC: 49 U/L
LYMPHOCYTES # BLD AUTO: 2.51 X10(3)/MCL (ref 0.6–4.6)
LYMPHOCYTES NFR BLD AUTO: 33.5 %
MAGNESIUM SERPL-MCNC: 2 MG/DL (ref 1.6–2.6)
MCH RBC QN AUTO: 28.8 PG
MCHC RBC AUTO-ENTMCNC: 32.8 G/DL (ref 33–36)
MCV RBC AUTO: 87.9 FL (ref 80–94)
MDMA UR QL SCN: NEGATIVE
MONOCYTES # BLD AUTO: 0.44 X10(3)/MCL (ref 0.1–1.3)
MONOCYTES NFR BLD AUTO: 5.9 %
NEUTROPHILS # BLD AUTO: 4.36 X10(3)/MCL (ref 2.1–9.2)
NEUTROPHILS NFR BLD AUTO: 58.3 %
NITRITE UR QL STRIP.AUTO: NEGATIVE
NRBC BLD AUTO-RTO: 0 %
OPIATES UR QL SCN: NEGATIVE
PCP UR QL: NEGATIVE
PH UR STRIP.AUTO: 7.5 [PH]
PH UR: 7.5 [PH] (ref 3–11)
PLATELET # BLD AUTO: 223 X10(3)/MCL (ref 130–400)
PMV BLD AUTO: 10.4 FL (ref 7.4–10.4)
POTASSIUM SERPL-SCNC: 3.5 MMOL/L (ref 3.5–5.1)
PROT SERPL-MCNC: 8.2 GM/DL (ref 6.4–8.3)
PROT UR QL STRIP.AUTO: NEGATIVE MG/DL
RBC # BLD AUTO: 5.38 X10(6)/MCL (ref 4.7–6.1)
RBC UR QL AUTO: NEGATIVE UNIT/L
SARS-COV-2 RNA RESP QL NAA+PROBE: NOT DETECTED
SODIUM SERPL-SCNC: 141 MMOL/L (ref 136–145)
SP GR UR STRIP.AUTO: 1.02
TROPONIN I SERPL-MCNC: <0.01 NG/ML (ref 0–0.04)
UROBILINOGEN UR STRIP-ACNC: 1 MG/DL
WBC # SPEC AUTO: 7.5 X10(3)/MCL (ref 4.5–11.5)

## 2023-03-10 PROCEDURE — 85025 COMPLETE CBC W/AUTO DIFF WBC: CPT | Performed by: EMERGENCY MEDICINE

## 2023-03-10 PROCEDURE — 96374 THER/PROPH/DIAG INJ IV PUSH: CPT

## 2023-03-10 PROCEDURE — 83880 ASSAY OF NATRIURETIC PEPTIDE: CPT | Performed by: EMERGENCY MEDICINE

## 2023-03-10 PROCEDURE — 80307 DRUG TEST PRSMV CHEM ANLYZR: CPT | Performed by: EMERGENCY MEDICINE

## 2023-03-10 PROCEDURE — 93010 EKG 12-LEAD: ICD-10-PCS | Mod: ,,, | Performed by: INTERNAL MEDICINE

## 2023-03-10 PROCEDURE — 81003 URINALYSIS AUTO W/O SCOPE: CPT | Mod: 59 | Performed by: EMERGENCY MEDICINE

## 2023-03-10 PROCEDURE — 93005 ELECTROCARDIOGRAM TRACING: CPT

## 2023-03-10 PROCEDURE — 96372 THER/PROPH/DIAG INJ SC/IM: CPT | Performed by: EMERGENCY MEDICINE

## 2023-03-10 PROCEDURE — 0240U COVID/FLU A&B PCR: CPT | Performed by: EMERGENCY MEDICINE

## 2023-03-10 PROCEDURE — 80053 COMPREHEN METABOLIC PANEL: CPT | Performed by: EMERGENCY MEDICINE

## 2023-03-10 PROCEDURE — 83690 ASSAY OF LIPASE: CPT | Performed by: EMERGENCY MEDICINE

## 2023-03-10 PROCEDURE — 82077 ASSAY SPEC XCP UR&BREATH IA: CPT | Performed by: EMERGENCY MEDICINE

## 2023-03-10 PROCEDURE — 96375 TX/PRO/DX INJ NEW DRUG ADDON: CPT

## 2023-03-10 PROCEDURE — 63600175 PHARM REV CODE 636 W HCPCS: Performed by: EMERGENCY MEDICINE

## 2023-03-10 PROCEDURE — 25000003 PHARM REV CODE 250: Performed by: EMERGENCY MEDICINE

## 2023-03-10 PROCEDURE — 83735 ASSAY OF MAGNESIUM: CPT | Performed by: EMERGENCY MEDICINE

## 2023-03-10 PROCEDURE — 99284 EMERGENCY DEPT VISIT MOD MDM: CPT

## 2023-03-10 PROCEDURE — 84484 ASSAY OF TROPONIN QUANT: CPT | Performed by: EMERGENCY MEDICINE

## 2023-03-10 PROCEDURE — 93010 ELECTROCARDIOGRAM REPORT: CPT | Mod: ,,, | Performed by: INTERNAL MEDICINE

## 2023-03-10 RX ORDER — ONDANSETRON 2 MG/ML
4 INJECTION INTRAMUSCULAR; INTRAVENOUS
Status: COMPLETED | OUTPATIENT
Start: 2023-03-10 | End: 2023-03-10

## 2023-03-10 RX ORDER — SODIUM CHLORIDE 9 MG/ML
1000 INJECTION, SOLUTION INTRAVENOUS
Status: COMPLETED | OUTPATIENT
Start: 2023-03-10 | End: 2023-03-10

## 2023-03-10 RX ORDER — ZIPRASIDONE MESYLATE 20 MG/ML
20 INJECTION, POWDER, LYOPHILIZED, FOR SOLUTION INTRAMUSCULAR
Status: COMPLETED | OUTPATIENT
Start: 2023-03-10 | End: 2023-03-10

## 2023-03-10 RX ORDER — HYDRALAZINE HYDROCHLORIDE 20 MG/ML
10 INJECTION INTRAMUSCULAR; INTRAVENOUS
Status: COMPLETED | OUTPATIENT
Start: 2023-03-10 | End: 2023-03-10

## 2023-03-10 RX ADMIN — ZIPRASIDONE MESYLATE 20 MG: 20 INJECTION, POWDER, LYOPHILIZED, FOR SOLUTION INTRAMUSCULAR at 09:03

## 2023-03-10 RX ADMIN — ONDANSETRON 4 MG: 2 INJECTION INTRAMUSCULAR; INTRAVENOUS at 09:03

## 2023-03-10 RX ADMIN — SODIUM CHLORIDE 1000 ML: 9 INJECTION, SOLUTION INTRAVENOUS at 09:03

## 2023-03-10 RX ADMIN — HYDRALAZINE HYDROCHLORIDE 10 MG: 20 INJECTION INTRAMUSCULAR; INTRAVENOUS at 09:03

## 2023-03-10 NOTE — ED PROVIDER NOTES
Encounter Date: 3/10/2023       History     Chief Complaint   Patient presents with    Chest Pain     Chest Pain and headache, hypertension     47-year-old male with a history of hypertension, diabetes, GERD, presents to the emergency room wailing and crying, rocking back and forth in the bed, complaining of chest pain.He is retching loudly with no emesis at this time.  He states that he feels overwhelmed and his head hurts and his abdomen hurts as well.  He is prescribed medication for his diabetes and hypertension but is not taking this.  He states that he smokes black and mild but does not drink alcohol or do drugs.  He states that usually when this happens at home he gets and a tub of hot water with Epson salt and green alcohol and that relieves his symptoms. He is diaphoretic and positive for cannabis in the past.      Review of patient's allergies indicates:   Allergen Reactions    Iodine Nausea And Vomiting     Other reaction(s): Hives     Past Medical History:   Diagnosis Date    Diabetes mellitus     GERD (gastroesophageal reflux disease)     Hypertension      No past surgical history on file.  No family history on file.     Review of Systems   Gastrointestinal:  Positive for abdominal pain, nausea and vomiting.   All other systems reviewed and are negative.    Physical Exam     Initial Vitals   BP Pulse Resp Temp SpO2   03/10/23 0846 03/10/23 0846 03/10/23 0846 03/10/23 0850 03/10/23 0846   (!) 196/113 64 16 98.2 °F (36.8 °C) 100 %      MAP       --                Physical Exam    Nursing note and vitals reviewed.  Constitutional: Vital signs are normal. He appears well-developed and well-nourished.   HENT:   Head: Normocephalic and atraumatic.   Eyes: EOM are normal. Pupils are equal, round, and reactive to light.   Neck: Neck supple.   Cardiovascular:  Normal rate, regular rhythm and normal heart sounds.           Pulmonary/Chest: Breath sounds normal. No respiratory distress.   Abdominal: There is  abdominal tenderness.   Positive diffuse tenderness   Musculoskeletal:      Cervical back: Neck supple. No edema or erythema.     Neurological: He is alert and oriented to person, place, and time. GCS score is 15. GCS eye subscore is 4. GCS verbal subscore is 5. GCS motor subscore is 6.   Skin: Skin is warm.   Diaphoretic   Psychiatric:   Wailing, crying, making himself gag and retch loudly       ED Course   Procedures  Labs Reviewed   CBC WITH DIFFERENTIAL - Abnormal; Notable for the following components:       Result Value    MCHC 32.8 (*)     All other components within normal limits   COMPREHENSIVE METABOLIC PANEL - Abnormal; Notable for the following components:    Glucose Level 169 (*)     Creatinine 1.19 (*)     Globulin 3.7 (*)     All other components within normal limits   URINALYSIS, REFLEX TO URINE CULTURE - Abnormal; Notable for the following components:    Glucose,  (*)     All other components within normal limits   DRUG SCREEN, URINE (BEAKER) - Abnormal; Notable for the following components:    Benzodiazepine, Urine Positive (*)     All other components within normal limits    Narrative:     Cut off concentrations:    Amphetamines - 1000 ng/ml  Barbiturates - 200 ng/ml  Benzodiazepine - 200 ng/ml  Cannabinoids (THC) - 50 ng/ml  Cocaine - 300 ng/ml  Fentanyl - 1.0 ng/ml  MDMA - 500 ng/ml  Opiates - 300 ng/ml   Phencyclidine (PCP) - 25 ng/ml    Specimen submitted for drug analysis and tested for pH and specific gravity in order to evaluate sample integrity. Suspect tampering if specific gravity is <1.003 and/or pH is not within the range of 4.5 - 8.0  False negatives may result form substances such as bleach added to urine.  False positives may result for the presence of a substance with similar chemical structure to the drug or its metabolite.    This test provides only a PRELIMINARY analytical test result. A more specific alternate chemical method must be used in order to obtain a confirmed  analytical result. Gas chromatography/mass spectrometry (GC/MS) is the preferred confirmatory method. Other chemical confirmation methods are available. Clinical consideration and professional judgement should be applied to any drug of abuse test result, particularly when preliminary positive results are used.    Positive results will be confirmed only at the physicians request. Unconfirmed screening results are to be used only for medical purposes (treatment).        ALCOHOL,MEDICAL (ETHANOL) - Normal   TROPONIN I - Normal   B-TYPE NATRIURETIC PEPTIDE - Normal   COVID/FLU A&B PCR - Normal    Narrative:     The Xpert Xpress SARS-CoV-2/FLU/RSV plus is a rapid, multiplexed real-time PCR test intended for the simultaneous qualitative detection and differentiation of SARS-CoV-2, Influenza A, Influenza B, and respiratory syncytial virus (RSV) viral RNA in either nasopharyngeal swab or nasal swab specimens.         LIPASE - Normal   MAGNESIUM - Normal     EKG Readings: (Independently Interpreted)   Rhythm: Normal Sinus Rhythm. Heart Rate: 61. ST Segments: Normal ST Segments. Clinical Impression: Normal Sinus Rhythm Other Impression: Nonspecific T-wave flattening noticed in lead 3 and AVF   ECG Results              EKG 12-lead (Final result)  Result time 03/10/23 21:45:36      Final result by Interface, Lab In [facility] (03/10/23 21:45:36)                   Narrative:    Test Reason : R07.9,    Vent. Rate : 061 BPM     Atrial Rate : 061 BPM     P-R Int : 154 ms          QRS Dur : 086 ms      QT Int : 370 ms       P-R-T Axes : 050 017 004 degrees     QTc Int : 372 ms    Normal sinus rhythm with sinus arrhythmia  Nonspecific T wave abnormality  Abnormal ECG  Confirmed by Octavio Callahan MD ([id number]) on 3/10/2023 9:45:26 PM    Referred By:             Confirmed By:Octavio Callahan MD                                  Imaging Results    None          Medications   ondansetron injection 4 mg (4 mg Intravenous Given 3/10/23 0169)    hydrALAZINE injection 10 mg (10 mg Intravenous Given 3/10/23 0915)   ziprasidone injection 20 mg (20 mg Intramuscular Given 3/10/23 0935)   0.9%  NaCl infusion (1,000 mLs Intravenous New Bag 3/10/23 0947)     Medical Decision Making:   Initial Assessment:   47-year-old male with a history of hypertension, diabetes, GERD, presents to the emergency room wailing and crying, rocking back and forth in the bed, complaining of chest pain.He is retching loudly with no emesis at this time.  He states that he feels overwhelmed and his head hurts and his abdomen hurts as well.  He is prescribed medication for his diabetes and hypertension but is not taking this.  He states that he smokes black and mild but does not drink alcohol or do drugs.  He states that usually when this happens at home he gets and a tub of hot water with Epson salt and green alcohol and that relieves his symptoms. He is diaphoretic and positive for cannabis in the past.    Differential Diagnosis:   Differential diagnosis includes but is not limited to cannabis hyperemesis syndrome, gastroenteritis, cholecystitis, pancreatitis, gastritis, perforated viscus, uncontrolled hypertension, substance abuse  Clinical Tests:   Lab Tests: Reviewed  ED Management:  Patient was seen and evaluated in the emergency room with history, physical exam, EKG, lab work.  Initially he complained of chest pain, nausea, vomiting, then later added that he also had headache and abdominal pain.  He was crying and wailing and rocking back and forth and diaphoretic.  I was very concerned he had serious pathology or possibly cannabis hyperemesis syndrome.  He was initially given IV fluids and Zofran with minimal relief so I gave him a shot of Geodon.  I also gave him hydralazine for his severely elevated blood pressure.  He then stopped crying and relaxed and stated that he was feeling much better.  His girlfriend arrived and he eloped without any further discussion with me.  I was  "considering doing any kind of imaging in the emergency room, getting him back on his medication, and rx for home meds but he left before I could discuss any of this with him.  He told the nurse "I am fine, I am going home"            ED Course as of 03/11/23 0721   Fri Mar 10, 2023   1019 Patient removed his IV and told the nurse that he was just going to go home and sleep.  His girlfriend arrived in his bringing him.  He left before I was able to have a discussion with him but his gait was stable, ambulatory without assistance, with his girlfriend driving him home. [SH]      ED Course User Index  [SH] Amarilis Roper MD                 Clinical Impression:   Final diagnoses:  [R07.9] Chest pain  [R11.10] Acute vomiting (Primary)  [I10] Severe hypertension        ED Disposition Condition    Eloped Stable                Amarilis Roper MD  03/11/23 0721    "

## 2023-04-04 ENCOUNTER — PATIENT MESSAGE (OUTPATIENT)
Dept: RESEARCH | Facility: HOSPITAL | Age: 48
End: 2023-04-04
Payer: MEDICAID

## 2023-04-23 ENCOUNTER — HOSPITAL ENCOUNTER (EMERGENCY)
Facility: HOSPITAL | Age: 48
Discharge: HOME | End: 2023-04-23
Attending: EMERGENCY MEDICINE
Payer: MEDICAID

## 2023-04-23 VITALS
TEMPERATURE: 99 F | WEIGHT: 158.38 LBS | SYSTOLIC BLOOD PRESSURE: 174 MMHG | OXYGEN SATURATION: 100 % | HEART RATE: 86 BPM | HEIGHT: 69 IN | DIASTOLIC BLOOD PRESSURE: 90 MMHG | RESPIRATION RATE: 18 BRPM | BODY MASS INDEX: 23.46 KG/M2

## 2023-04-23 DIAGNOSIS — F12.90 CANNABINOID HYPEREMESIS SYNDROME: Primary | ICD-10-CM

## 2023-04-23 DIAGNOSIS — R11.2 CANNABINOID HYPEREMESIS SYNDROME: Primary | ICD-10-CM

## 2023-04-23 LAB
ALBUMIN SERPL-MCNC: 4.5 G/DL (ref 3.5–5)
ALBUMIN/GLOB SERPL: 1.2 RATIO (ref 1.1–2)
ALP SERPL-CCNC: 83 UNIT/L (ref 40–150)
ALT SERPL-CCNC: 16 UNIT/L (ref 0–55)
AMPHET UR QL SCN: NEGATIVE
APPEARANCE UR: CLEAR
AST SERPL-CCNC: 13 UNIT/L (ref 5–34)
BACTERIA #/AREA URNS AUTO: ABNORMAL /HPF
BARBITURATE SCN PRESENT UR: NEGATIVE
BASOPHILS # BLD AUTO: 0.03 X10(3)/MCL (ref 0–0.2)
BASOPHILS NFR BLD AUTO: 0.5 %
BENZODIAZ UR QL SCN: POSITIVE
BILIRUB UR QL STRIP.AUTO: NEGATIVE MG/DL
BILIRUBIN DIRECT+TOT PNL SERPL-MCNC: 0.9 MG/DL
BUN SERPL-MCNC: 9.1 MG/DL (ref 8.4–25.7)
CALCIUM SERPL-MCNC: 10.3 MG/DL (ref 8.4–10.2)
CANNABINOIDS UR QL SCN: POSITIVE
CHLORIDE SERPL-SCNC: 100 MMOL/L (ref 98–107)
CO2 SERPL-SCNC: 26 MMOL/L (ref 22–29)
COCAINE UR QL SCN: NEGATIVE
COLOR UR AUTO: YELLOW
CREAT SERPL-MCNC: 1.13 MG/DL (ref 0.73–1.18)
EOSINOPHIL # BLD AUTO: 0 X10(3)/MCL (ref 0–0.9)
EOSINOPHIL NFR BLD AUTO: 0 %
ERYTHROCYTE [DISTWIDTH] IN BLOOD BY AUTOMATED COUNT: 13.5 % (ref 11.5–17)
FENTANYL UR QL SCN: NEGATIVE
GFR SERPLBLD CREATININE-BSD FMLA CKD-EPI: >60 MLS/MIN/1.73/M2
GLOBULIN SER-MCNC: 3.7 GM/DL (ref 2.4–3.5)
GLUCOSE SERPL-MCNC: 190 MG/DL (ref 74–100)
GLUCOSE UR QL STRIP.AUTO: NEGATIVE MG/DL
HCT VFR BLD AUTO: 48.1 % (ref 42–52)
HGB BLD-MCNC: 15.7 G/DL (ref 14–18)
IMM GRANULOCYTES # BLD AUTO: 0.02 X10(3)/MCL (ref 0–0.04)
IMM GRANULOCYTES NFR BLD AUTO: 0.3 %
KETONES UR QL STRIP.AUTO: ABNORMAL MG/DL
LEUKOCYTE ESTERASE UR QL STRIP.AUTO: NEGATIVE UNIT/L
LIPASE SERPL-CCNC: 31 U/L
LYMPHOCYTES # BLD AUTO: 1.48 X10(3)/MCL (ref 0.6–4.6)
LYMPHOCYTES NFR BLD AUTO: 24.1 %
MCH RBC QN AUTO: 28.8 PG (ref 27–31)
MCHC RBC AUTO-ENTMCNC: 32.6 G/DL (ref 33–36)
MCV RBC AUTO: 88.3 FL (ref 80–94)
MDMA UR QL SCN: NEGATIVE
MONOCYTES # BLD AUTO: 0.33 X10(3)/MCL (ref 0.1–1.3)
MONOCYTES NFR BLD AUTO: 5.4 %
MUCOUS THREADS URNS QL MICRO: ABNORMAL /LPF
NEUTROPHILS # BLD AUTO: 4.27 X10(3)/MCL (ref 2.1–9.2)
NEUTROPHILS NFR BLD AUTO: 69.7 %
NITRITE UR QL STRIP.AUTO: NEGATIVE
NRBC BLD AUTO-RTO: 0 %
OPIATES UR QL SCN: NEGATIVE
PCP UR QL: NEGATIVE
PH UR STRIP.AUTO: 6 [PH]
PH UR: 6 [PH] (ref 3–11)
PLATELET # BLD AUTO: 226 X10(3)/MCL (ref 130–400)
PMV BLD AUTO: 10.1 FL (ref 7.4–10.4)
POTASSIUM SERPL-SCNC: 4 MMOL/L (ref 3.5–5.1)
PROT SERPL-MCNC: 8.2 GM/DL (ref 6.4–8.3)
PROT UR QL STRIP.AUTO: 30 MG/DL
RBC # BLD AUTO: 5.45 X10(6)/MCL (ref 4.7–6.1)
RBC #/AREA URNS AUTO: ABNORMAL /HPF
RBC UR QL AUTO: NEGATIVE UNIT/L
SODIUM SERPL-SCNC: 139 MMOL/L (ref 136–145)
SP GR UR STRIP.AUTO: 1.02
SQUAMOUS #/AREA URNS AUTO: ABNORMAL /HPF
UROBILINOGEN UR STRIP-ACNC: 1 MG/DL
WBC # SPEC AUTO: 6.1 X10(3)/MCL (ref 4.5–11.5)
WBC #/AREA URNS AUTO: ABNORMAL /HPF

## 2023-04-23 PROCEDURE — 83690 ASSAY OF LIPASE: CPT | Performed by: PHYSICIAN ASSISTANT

## 2023-04-23 PROCEDURE — 63600175 PHARM REV CODE 636 W HCPCS: Performed by: EMERGENCY MEDICINE

## 2023-04-23 PROCEDURE — 81001 URINALYSIS AUTO W/SCOPE: CPT | Performed by: PHYSICIAN ASSISTANT

## 2023-04-23 PROCEDURE — 96372 THER/PROPH/DIAG INJ SC/IM: CPT | Performed by: EMERGENCY MEDICINE

## 2023-04-23 PROCEDURE — 85025 COMPLETE CBC W/AUTO DIFF WBC: CPT | Performed by: PHYSICIAN ASSISTANT

## 2023-04-23 PROCEDURE — 80307 DRUG TEST PRSMV CHEM ANLYZR: CPT | Performed by: PHYSICIAN ASSISTANT

## 2023-04-23 PROCEDURE — 99284 EMERGENCY DEPT VISIT MOD MDM: CPT

## 2023-04-23 PROCEDURE — 25000003 PHARM REV CODE 250: Performed by: EMERGENCY MEDICINE

## 2023-04-23 PROCEDURE — 80053 COMPREHEN METABOLIC PANEL: CPT | Performed by: PHYSICIAN ASSISTANT

## 2023-04-23 RX ORDER — AMLODIPINE BESYLATE 10 MG/1
10 TABLET ORAL DAILY
Qty: 30 TABLET | Refills: 1 | Status: SHIPPED | OUTPATIENT
Start: 2023-04-23 | End: 2024-04-22

## 2023-04-23 RX ORDER — CYCLOBENZAPRINE HCL 10 MG
10 TABLET ORAL 2 TIMES DAILY
COMMUNITY
Start: 2023-04-12

## 2023-04-23 RX ORDER — ALPRAZOLAM 0.25 MG/1
0.25 TABLET ORAL
Status: COMPLETED | OUTPATIENT
Start: 2023-04-23 | End: 2023-04-23

## 2023-04-23 RX ORDER — PANTOPRAZOLE SODIUM 40 MG/1
40 TABLET, DELAYED RELEASE ORAL DAILY
Qty: 30 TABLET | Refills: 1 | Status: SHIPPED | OUTPATIENT
Start: 2023-04-23 | End: 2024-04-22

## 2023-04-23 RX ORDER — PROMETHAZINE HYDROCHLORIDE 25 MG/1
25 TABLET ORAL EVERY 6 HOURS PRN
Qty: 20 TABLET | Refills: 0 | Status: SHIPPED | OUTPATIENT
Start: 2023-04-23

## 2023-04-23 RX ORDER — HYDRALAZINE HYDROCHLORIDE 10 MG/1
10 TABLET, FILM COATED ORAL 2 TIMES DAILY
COMMUNITY
Start: 2023-04-12

## 2023-04-23 RX ORDER — ONDANSETRON 8 MG/1
8 TABLET, ORALLY DISINTEGRATING ORAL EVERY 12 HOURS PRN
Qty: 12 TABLET | Refills: 1 | Status: SHIPPED | OUTPATIENT
Start: 2023-04-23

## 2023-04-23 RX ORDER — GABAPENTIN 300 MG/1
300 CAPSULE ORAL 3 TIMES DAILY
COMMUNITY
Start: 2023-04-04

## 2023-04-23 RX ORDER — ZIPRASIDONE MESYLATE 20 MG/ML
20 INJECTION, POWDER, LYOPHILIZED, FOR SOLUTION INTRAMUSCULAR
Status: COMPLETED | OUTPATIENT
Start: 2023-04-23 | End: 2023-04-23

## 2023-04-23 RX ORDER — ONDANSETRON HYDROCHLORIDE 8 MG/1
8 TABLET, FILM COATED ORAL EVERY 8 HOURS PRN
COMMUNITY
Start: 2023-04-12

## 2023-04-23 RX ADMIN — ZIPRASIDONE MESYLATE 20 MG: 20 INJECTION, POWDER, LYOPHILIZED, FOR SOLUTION INTRAMUSCULAR at 08:04

## 2023-04-23 RX ADMIN — ALPRAZOLAM 0.25 MG: 0.25 TABLET ORAL at 09:04

## 2023-04-23 NOTE — Clinical Note
"Thomas Thomas (Curtis) was seen and treated in our emergency department on 4/23/2023.  He may return to work on 04/25/2023.       If you have any questions or concerns, please don't hesitate to call.      Amarilis Roper MD"

## 2023-04-24 NOTE — ED PROVIDER NOTES
"Encounter Date: 4/23/2023       History     Chief Complaint   Patient presents with    Emesis     C/o vomiting with abdominal pain X 1 month. States he was seen for same 1 month ago     48-year-old male with a history of diabetes, HTN, GERD, cannabis hyperemesis syndrome, complains of recurrent episodes of generalized abdominal pain associated with nausea and vomiting.  He states that he smokes synthetic marijuana more than regular marijuana and that he just "can't think" sometimes.  He admits to smoking marijuana just prior to arrival to try to relieve his symptoms.  He states that when he gets under stress he starts feeling lot pressure in his stomach starts gagging.  Sometimes soaking in a hot tub helps relieve the abdominal pain.  He is not taking medication for his diabetes or hypertension and states he has an appointment with a PCP on April 27th.      Review of patient's allergies indicates:   Allergen Reactions    Iodine Nausea And Vomiting and Hives     Other reaction(s): Hives     Past Medical History:   Diagnosis Date    Cannabis hyperemesis syndrome concurrent with and due to cannabis abuse     Diabetes mellitus     GERD (gastroesophageal reflux disease)     Hypertension      No past surgical history on file.  No family history on file.     Review of Systems   Gastrointestinal:  Positive for nausea and vomiting.   Psychiatric/Behavioral:  The patient is nervous/anxious.    All other systems reviewed and are negative.    Physical Exam     Initial Vitals [04/23/23 1653]   BP Pulse Resp Temp SpO2   (!) 152/106 86 18 98.6 °F (37 °C) 100 %      MAP       --         Physical Exam    Nursing note and vitals reviewed.  Constitutional: Vital signs are normal. He appears well-developed and well-nourished.   HENT:   Head: Normocephalic and atraumatic.   Mouth/Throat: Oropharynx is clear and moist.   Eyes: Pupils are equal, round, and reactive to light.   Neck: Neck supple.   Cardiovascular:  Normal rate, regular " rhythm and normal heart sounds.           Pulmonary/Chest: Breath sounds normal. No respiratory distress.   Abdominal: Abdomen is soft. He exhibits no mass. There is no abdominal tenderness. There is no rebound and no guarding.   Musculoskeletal:         General: No tenderness or edema.      Cervical back: Neck supple. No edema or erythema.     Lymphadenopathy:     He has no cervical adenopathy.   Neurological: He is alert and oriented to person, place, and time. GCS score is 15. GCS eye subscore is 4. GCS verbal subscore is 5. GCS motor subscore is 6.   Skin: Skin is warm and dry.   Psychiatric:   Anxious, polite cooperative, not suicidal or homicidal       ED Course   Procedures  Labs Reviewed   COMPREHENSIVE METABOLIC PANEL - Abnormal; Notable for the following components:       Result Value    Glucose Level 190 (*)     Calcium Level Total 10.3 (*)     Globulin 3.7 (*)     All other components within normal limits   URINALYSIS, REFLEX TO URINE CULTURE - Abnormal; Notable for the following components:    Protein, UA 30 (*)     Ketones, UA Trace (*)     All other components within normal limits   DRUG SCREEN, URINE (BEAKER) - Abnormal; Notable for the following components:    Benzodiazepine, Urine Positive (*)     Cannabinoids, Urine Positive (*)     All other components within normal limits    Narrative:     Cut off concentrations:    Amphetamines - 1000 ng/ml  Barbiturates - 200 ng/ml  Benzodiazepine - 200 ng/ml  Cannabinoids (THC) - 50 ng/ml  Cocaine - 300 ng/ml  Fentanyl - 1.0 ng/ml  MDMA - 500 ng/ml  Opiates - 300 ng/ml   Phencyclidine (PCP) - 25 ng/ml    Specimen submitted for drug analysis and tested for pH and specific gravity in order to evaluate sample integrity. Suspect tampering if specific gravity is <1.003 and/or pH is not within the range of 4.5 - 8.0  False negatives may result form substances such as bleach added to urine.  False positives may result for the presence of a substance with similar  chemical structure to the drug or its metabolite.    This test provides only a PRELIMINARY analytical test result. A more specific alternate chemical method must be used in order to obtain a confirmed analytical result. Gas chromatography/mass spectrometry (GC/MS) is the preferred confirmatory method. Other chemical confirmation methods are available. Clinical consideration and professional judgement should be applied to any drug of abuse test result, particularly when preliminary positive results are used.    Positive results will be confirmed only at the physicians request. Unconfirmed screening results are to be used only for medical purposes (treatment).        CBC WITH DIFFERENTIAL - Abnormal; Notable for the following components:    MCHC 32.6 (*)     All other components within normal limits   URINALYSIS, MICROSCOPIC - Abnormal; Notable for the following components:    Mucous, UA Trace (*)     Squamous Epithelial Cells, UA Few (*)     All other components within normal limits   LIPASE - Normal   CBC W/ AUTO DIFFERENTIAL    Narrative:     The following orders were created for panel order CBC auto differential.  Procedure                               Abnormality         Status                     ---------                               -----------         ------                     CBC with Differential[[id number]]        Abnormal            Final result                 Please view results for these tests on the individual orders.          Imaging Results    None          Medications   ziprasidone injection 20 mg (20 mg Intramuscular Given 4/23/23 2029)   ALPRAZolam tablet 0.25 mg (0.25 mg Oral Given 4/23/23 2115)     Medical Decision Making:   Initial Assessment:   48-year-old male with a history of diabetes, HTN, GERD, cannabis hyperemesis syndrome, complains of recurrent episodes of generalized abdominal pain associated with nausea and vomiting.  He states that he smokes synthetic marijuana more than regular  "marijuana and that he just "can't think" sometimes.  He admits to smoking marijuana just prior to arrival to try to relieve his symptoms.  He states that when he gets under stress he starts feeling lot pressure in his stomach starts gagging.  Sometimes soaking in a hot tub helps relieve the abdominal pain.  He is not taking medication for his diabetes or hypertension and states he has an appointment with a PCP on April 27th.      Differential Diagnosis:   Differential diagnosis includes but is not limited to cannabis hyperemesis syndrome, diabetic gastroparesis, gastritis, cholecystitis, pancreatitis, bowel obstruction, UTI, polysubstance abuse, uncontrolled hypertension, uncontrolled diabetes  Clinical Tests:   Lab Tests: Reviewed  ED Management:  Patient was seen and evaluated in the emergency department with history and physical exam.  His lab work is benign and he has no abdominal tenderness on exam.  He is complaining of nausea so I gave him a shot of Geodon which helped him in the past.  At the time when I was ready to discharge him he also was complaining of severe anxiety and the nausea had resolved so I gave him a dose of Xanax.  He understands the importance of taking his prescription medication and following up with his PCP.  I discussed with him at length the importance of discontinuing the marijuana and the synthetic weed as well.  He says he will "try".  Considering he has no abdominal tenderness, normal white count, afebrile, I did not feel it necessary to do imaging.  He is stable for discharge home and all questions were answered.           ED Course as of 04/24/23 0045   Sun Apr 23, 2023   1213 Patient states that his abdominal pain and nausea has completely resolved but now just feeling really nervous.  He is asking for anxiety medication.  I have discussed with him the importance of quitting the illicit substances and keeping his appointment with his PCP.  I have since several prescriptions to " the pharmacy and will give him a single dose of Xanax 0.25 mg prior to discharge.  At this time he is stable, all questions answered, and leaving with his wife. [SH]      ED Course User Index  [SH] Amarilis Roper MD                 Clinical Impression:   Final diagnoses:  [R11.2, F12.90] Cannabinoid hyperemesis syndrome (Primary)        ED Disposition Condition    Discharge Stable          ED Prescriptions       Medication Sig Dispense Start Date End Date Auth. Provider    empagliflozin (JARDIANCE) 10 mg tablet Take 1 tablet (10 mg total) by mouth once daily. 30 tablet 4/23/2023 -- Amarilis Roper MD    amLODIPine (NORVASC) 10 MG tablet Take 1 tablet (10 mg total) by mouth once daily. 30 tablet 4/23/2023 4/22/2024 Amarilis Roper MD    promethazine (PHENERGAN) 25 MG tablet Take 1 tablet (25 mg total) by mouth every 6 (six) hours as needed for Nausea. 20 tablet 4/23/2023 -- Amarilis Roper MD    pantoprazole (PROTONIX) 40 MG tablet Take 1 tablet (40 mg total) by mouth once daily. 30 tablet 4/23/2023 4/22/2024 Amarilis Roper MD    ondansetron (ZOFRAN-ODT) 8 MG TbDL Take 1 tablet (8 mg total) by mouth every 12 (twelve) hours as needed (nausea not releived by promethazine). 12 tablet 4/23/2023 -- Amarilis Roper MD          Follow-up Information       Follow up With Specialties Details Why Contact Info    PCP   Keep your appointment as scheduled keep your appointment as scheduled              Amarilis Roper MD  04/24/23 0046

## 2024-04-02 ENCOUNTER — HOSPITAL ENCOUNTER (EMERGENCY)
Facility: HOSPITAL | Age: 49
Discharge: HOME | End: 2024-04-02
Attending: FAMILY MEDICINE
Payer: COMMERCIAL

## 2024-04-02 VITALS
BODY MASS INDEX: 22.73 KG/M2 | SYSTOLIC BLOOD PRESSURE: 168 MMHG | OXYGEN SATURATION: 100 % | HEART RATE: 68 BPM | WEIGHT: 158.75 LBS | HEIGHT: 70 IN | DIASTOLIC BLOOD PRESSURE: 97 MMHG | TEMPERATURE: 98 F | RESPIRATION RATE: 18 BRPM

## 2024-04-02 DIAGNOSIS — T23.201A PARTIAL THICKNESS BURN OF RIGHT HAND, UNSPECIFIED SITE OF HAND, INITIAL ENCOUNTER: Primary | ICD-10-CM

## 2024-04-02 PROCEDURE — 99284 EMERGENCY DEPT VISIT MOD MDM: CPT | Mod: 25

## 2024-04-02 PROCEDURE — 90715 TDAP VACCINE 7 YRS/> IM: CPT | Performed by: PHYSICIAN ASSISTANT

## 2024-04-02 PROCEDURE — 25000003 PHARM REV CODE 250: Performed by: PHYSICIAN ASSISTANT

## 2024-04-02 PROCEDURE — 63600175 PHARM REV CODE 636 W HCPCS: Performed by: PHYSICIAN ASSISTANT

## 2024-04-02 PROCEDURE — 90471 IMMUNIZATION ADMIN: CPT | Performed by: PHYSICIAN ASSISTANT

## 2024-04-02 RX ORDER — SILVER SULFADIAZINE 10 G/1000G
CREAM TOPICAL 2 TIMES DAILY
Qty: 50 G | Refills: 0 | Status: SHIPPED | OUTPATIENT
Start: 2024-04-02 | End: 2024-04-09

## 2024-04-02 RX ORDER — SILVER SULFADIAZINE 10 G/1000G
1 CREAM TOPICAL
Status: COMPLETED | OUTPATIENT
Start: 2024-04-02 | End: 2024-04-02

## 2024-04-02 RX ORDER — HYDROCODONE BITARTRATE AND ACETAMINOPHEN 10; 325 MG/1; MG/1
1 TABLET ORAL EVERY 6 HOURS PRN
Qty: 20 TABLET | Refills: 0 | Status: SHIPPED | OUTPATIENT
Start: 2024-04-02 | End: 2024-04-02

## 2024-04-02 RX ORDER — OXYCODONE AND ACETAMINOPHEN 5; 325 MG/1; MG/1
2 TABLET ORAL
Status: COMPLETED | OUTPATIENT
Start: 2024-04-02 | End: 2024-04-02

## 2024-04-02 RX ORDER — SILVER SULFADIAZINE 10 G/1000G
CREAM TOPICAL 2 TIMES DAILY
Qty: 50 G | Refills: 0 | Status: SHIPPED | OUTPATIENT
Start: 2024-04-02 | End: 2024-04-02

## 2024-04-02 RX ORDER — HYDROCODONE BITARTRATE AND ACETAMINOPHEN 10; 325 MG/1; MG/1
1 TABLET ORAL EVERY 6 HOURS PRN
Qty: 20 TABLET | Refills: 0 | OUTPATIENT
Start: 2024-04-02 | End: 2024-06-10

## 2024-04-02 RX ADMIN — SILVER SULFADIAZINE 1 TUBE: 10 CREAM TOPICAL at 07:04

## 2024-04-02 RX ADMIN — OXYCODONE HYDROCHLORIDE AND ACETAMINOPHEN 2 TABLET: 5; 325 TABLET ORAL at 03:04

## 2024-04-02 RX ADMIN — TETANUS TOXOID, REDUCED DIPHTHERIA TOXOID AND ACELLULAR PERTUSSIS VACCINE, ADSORBED 0.5 ML: 5; 2.5; 8; 8; 2.5 SUSPENSION INTRAMUSCULAR at 03:04

## 2024-04-02 NOTE — DISCHARGE INSTRUCTIONS
It is important that you follow up with your primary care provider or specialist if indicated for further evaluation, workup, and treatment as necessary. The exam and treatment you received in Emergency Department was for an urgent problem and NOT INTENDED AS COMPLETE CARE. It is important that you FOLLOW UP with a doctor for ongoing care. If your symptoms become WORSE or you DO NOT IMPROVE and you are unable to reach your health care provider, you should RETURN to the Emergency Department. The Emergency Department provider has provided a PRELIMINARY INTERPRETATION of all your studies. A final interpretation may be done after you are discharged. If a change in your diagnosis or treatment is needed WE WILL CONTACT YOU. It is critical that we have a CURRENT PHONE NUMBER FOR YOU.
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
## 2024-05-15 ENCOUNTER — HOSPITAL ENCOUNTER (EMERGENCY)
Facility: HOSPITAL | Age: 49
Discharge: HOME | End: 2024-05-15
Attending: EMERGENCY MEDICINE
Payer: COMMERCIAL

## 2024-05-15 VITALS
BODY MASS INDEX: 22.81 KG/M2 | SYSTOLIC BLOOD PRESSURE: 151 MMHG | HEART RATE: 73 BPM | DIASTOLIC BLOOD PRESSURE: 94 MMHG | HEIGHT: 69 IN | RESPIRATION RATE: 17 BRPM | OXYGEN SATURATION: 99 % | WEIGHT: 154 LBS | TEMPERATURE: 99 F

## 2024-05-15 DIAGNOSIS — I10 PRIMARY HYPERTENSION: ICD-10-CM

## 2024-05-15 DIAGNOSIS — R07.89 OTHER CHEST PAIN: Primary | ICD-10-CM

## 2024-05-15 LAB
ALBUMIN SERPL-MCNC: 4.5 G/DL (ref 3.5–5)
ALBUMIN/GLOB SERPL: 1.5 RATIO (ref 1.1–2)
ALP SERPL-CCNC: 74 UNIT/L (ref 40–150)
ALT SERPL-CCNC: 15 UNIT/L (ref 0–55)
AMPHET UR QL SCN: NEGATIVE
AST SERPL-CCNC: 21 UNIT/L (ref 5–34)
BACTERIA #/AREA URNS AUTO: ABNORMAL /HPF
BARBITURATE SCN PRESENT UR: NEGATIVE
BASOPHILS # BLD AUTO: 0.02 X10(3)/MCL
BASOPHILS NFR BLD AUTO: 0.3 %
BENZODIAZ UR QL SCN: POSITIVE
BILIRUB SERPL-MCNC: 0.8 MG/DL
BILIRUB UR QL STRIP.AUTO: NEGATIVE
BNP BLD-MCNC: 54.6 PG/ML
BUN SERPL-MCNC: 12.5 MG/DL (ref 8.4–25.7)
CALCIUM SERPL-MCNC: 9.5 MG/DL (ref 8.4–10.2)
CANNABINOIDS UR QL SCN: POSITIVE
CHLORIDE SERPL-SCNC: 107 MMOL/L (ref 98–107)
CK SERPL-CCNC: 328 U/L (ref 30–200)
CLARITY UR: CLEAR
CO2 SERPL-SCNC: 25 MMOL/L (ref 22–29)
COCAINE UR QL SCN: NEGATIVE
COLOR UR AUTO: ABNORMAL
CREAT SERPL-MCNC: 1.21 MG/DL (ref 0.73–1.18)
EOSINOPHIL # BLD AUTO: 0.09 X10(3)/MCL (ref 0–0.9)
EOSINOPHIL NFR BLD AUTO: 1.3 %
ERYTHROCYTE [DISTWIDTH] IN BLOOD BY AUTOMATED COUNT: 13.9 % (ref 11.5–17)
FENTANYL UR QL SCN: NEGATIVE
GFR SERPLBLD CREATININE-BSD FMLA CKD-EPI: >60 ML/MIN/1.73/M2
GLOBULIN SER-MCNC: 3.1 GM/DL (ref 2.4–3.5)
GLUCOSE SERPL-MCNC: 99 MG/DL (ref 74–100)
GLUCOSE UR QL STRIP: NEGATIVE
HCT VFR BLD AUTO: 40.1 % (ref 42–52)
HGB BLD-MCNC: 13.3 G/DL (ref 14–18)
HGB UR QL STRIP: NEGATIVE
IMM GRANULOCYTES # BLD AUTO: 0.01 X10(3)/MCL (ref 0–0.04)
IMM GRANULOCYTES NFR BLD AUTO: 0.1 %
KETONES UR QL STRIP: ABNORMAL
LEUKOCYTE ESTERASE UR QL STRIP: NEGATIVE
LIPASE SERPL-CCNC: 77 U/L
LYMPHOCYTES # BLD AUTO: 2.63 X10(3)/MCL (ref 0.6–4.6)
LYMPHOCYTES NFR BLD AUTO: 38.8 %
MCH RBC QN AUTO: 29.3 PG (ref 27–31)
MCHC RBC AUTO-ENTMCNC: 33.2 G/DL (ref 33–36)
MCV RBC AUTO: 88.3 FL (ref 80–94)
MDMA UR QL SCN: NEGATIVE
MONOCYTES # BLD AUTO: 0.53 X10(3)/MCL (ref 0.1–1.3)
MONOCYTES NFR BLD AUTO: 7.8 %
MUCOUS THREADS URNS QL MICRO: ABNORMAL /LPF
NEUTROPHILS # BLD AUTO: 3.5 X10(3)/MCL (ref 2.1–9.2)
NEUTROPHILS NFR BLD AUTO: 51.7 %
NITRITE UR QL STRIP: NEGATIVE
NRBC BLD AUTO-RTO: 0 %
OPIATES UR QL SCN: NEGATIVE
PCP UR QL: NEGATIVE
PH UR STRIP: 6 [PH]
PH UR: 6 [PH] (ref 3–11)
PLATELET # BLD AUTO: 194 X10(3)/MCL (ref 130–400)
PMV BLD AUTO: 10.4 FL (ref 7.4–10.4)
POTASSIUM SERPL-SCNC: 3.9 MMOL/L (ref 3.5–5.1)
PROT SERPL-MCNC: 7.6 GM/DL (ref 6.4–8.3)
PROT UR QL STRIP: ABNORMAL
RBC # BLD AUTO: 4.54 X10(6)/MCL (ref 4.7–6.1)
RBC #/AREA URNS AUTO: ABNORMAL /HPF
SODIUM SERPL-SCNC: 140 MMOL/L (ref 136–145)
SP GR UR STRIP.AUTO: >=1.03 (ref 1–1.03)
SPECIFIC GRAVITY, URINE AUTO (.000) (OHS): >=1.03 (ref 1–1.03)
SQUAMOUS #/AREA URNS AUTO: ABNORMAL /HPF
TROPONIN I SERPL-MCNC: <0.01 NG/ML (ref 0–0.04)
UROBILINOGEN UR STRIP-ACNC: >=8
WBC # SPEC AUTO: 6.78 X10(3)/MCL (ref 4.5–11.5)
WBC #/AREA URNS AUTO: ABNORMAL /HPF

## 2024-05-15 PROCEDURE — 85025 COMPLETE CBC W/AUTO DIFF WBC: CPT | Performed by: EMERGENCY MEDICINE

## 2024-05-15 PROCEDURE — 83880 ASSAY OF NATRIURETIC PEPTIDE: CPT | Performed by: EMERGENCY MEDICINE

## 2024-05-15 PROCEDURE — 80307 DRUG TEST PRSMV CHEM ANLYZR: CPT | Performed by: EMERGENCY MEDICINE

## 2024-05-15 PROCEDURE — 81001 URINALYSIS AUTO W/SCOPE: CPT | Mod: XB | Performed by: EMERGENCY MEDICINE

## 2024-05-15 PROCEDURE — 84484 ASSAY OF TROPONIN QUANT: CPT | Performed by: EMERGENCY MEDICINE

## 2024-05-15 PROCEDURE — 82550 ASSAY OF CK (CPK): CPT | Performed by: EMERGENCY MEDICINE

## 2024-05-15 PROCEDURE — 80053 COMPREHEN METABOLIC PANEL: CPT | Performed by: EMERGENCY MEDICINE

## 2024-05-15 PROCEDURE — 99285 EMERGENCY DEPT VISIT HI MDM: CPT | Mod: 25

## 2024-05-15 PROCEDURE — 83690 ASSAY OF LIPASE: CPT | Performed by: EMERGENCY MEDICINE

## 2024-05-15 PROCEDURE — 25000003 PHARM REV CODE 250: Performed by: EMERGENCY MEDICINE

## 2024-05-15 RX ORDER — AMLODIPINE BESYLATE 10 MG/1
10 TABLET ORAL DAILY
Qty: 90 TABLET | Refills: 3 | Status: SHIPPED | OUTPATIENT
Start: 2024-05-15 | End: 2025-05-15

## 2024-05-15 RX ORDER — ONDANSETRON 4 MG/1
4 TABLET, FILM COATED ORAL EVERY 6 HOURS PRN
Qty: 12 TABLET | Refills: 0 | Status: SHIPPED | OUTPATIENT
Start: 2024-05-15

## 2024-05-15 RX ORDER — SODIUM CHLORIDE 9 MG/ML
1000 INJECTION, SOLUTION INTRAVENOUS
Status: COMPLETED | OUTPATIENT
Start: 2024-05-15 | End: 2024-05-15

## 2024-05-15 RX ADMIN — SODIUM CHLORIDE 1000 ML: 9 INJECTION, SOLUTION INTRAVENOUS at 02:05

## 2024-05-15 NOTE — ED PROVIDER NOTES
Encounter Date: 5/15/2024       History     Chief Complaint   Patient presents with    Chest Pain     49-year-old male with a history of DM, HTN, GERD, cannabis hyperemesis syndrome, CAD, complains of nausea vomiting and chest pain which started 4 days ago.  The chest pain comes and goes.  No pain right now.  He has been vomiting intermittently.  He has no abdominal pain.  No diarrhea.  He brought his wife to the emergency room for chest pain but she to check in to be seen also since he is also complained of chest pain over the last few days.  He does have hypertension in his not taking his medication for that.        Review of patient's allergies indicates:   Allergen Reactions    Iodine Nausea And Vomiting and Hives     Other reaction(s): Hives     Past Medical History:   Diagnosis Date    Cannabis hyperemesis syndrome concurrent with and due to cannabis abuse     Diabetes mellitus     GERD (gastroesophageal reflux disease)     Hypertension      No past surgical history on file.  No family history on file.     Review of Systems   Cardiovascular:  Positive for chest pain.   Gastrointestinal:  Positive for nausea and vomiting.   All other systems reviewed and are negative.      Physical Exam     Initial Vitals [05/15/24 0103]   BP Pulse Resp Temp SpO2   (!) 178/107 77 18 98.6 °F (37 °C) 100 %      MAP       --         Physical Exam    Nursing note and vitals reviewed.  Constitutional: Vital signs are normal. He appears well-developed and well-nourished.   HENT:   Head: Normocephalic and atraumatic.   Mouth/Throat: Oropharynx is clear and moist.   Eyes: Pupils are equal, round, and reactive to light.   Neck: Neck supple. No JVD present.   Cardiovascular:  Normal rate, regular rhythm and normal heart sounds.           Pulmonary/Chest: Breath sounds normal. No respiratory distress.   Abdominal: Abdomen is soft. There is no abdominal tenderness.   Musculoskeletal:         General: No edema.      Cervical back: Neck  supple. No edema or erythema.     Lymphadenopathy:     He has no cervical adenopathy.   Neurological: He is alert and oriented to person, place, and time. No cranial nerve deficit. GCS score is 15. GCS eye subscore is 4. GCS verbal subscore is 5. GCS motor subscore is 6.   Skin: Skin is warm and dry. Capillary refill takes less than 2 seconds.         ED Course   Procedures  Labs Reviewed   COMPREHENSIVE METABOLIC PANEL - Abnormal; Notable for the following components:       Result Value    Creatinine 1.21 (*)     All other components within normal limits   CBC WITH DIFFERENTIAL - Abnormal; Notable for the following components:    RBC 4.54 (*)     Hgb 13.3 (*)     Hct 40.1 (*)     All other components within normal limits   CK - Abnormal; Notable for the following components:    Creatine Kinase 328 (*)     All other components within normal limits   LIPASE - Abnormal; Notable for the following components:    Lipase Level 77 (*)     All other components within normal limits   URINALYSIS, REFLEX TO URINE CULTURE - Abnormal; Notable for the following components:    Protein, UA Trace (*)     Ketones, UA Trace (*)     Urobilinogen, UA >=8.0 (*)     All other components within normal limits   DRUG SCREEN, URINE (BEAKER) - Abnormal; Notable for the following components:    Benzodiazepine, Urine Positive (*)     Cannabinoids, Urine Positive (*)     All other components within normal limits    Narrative:     Cut off concentrations:    Amphetamines - 1000 ng/ml  Barbiturates - 200 ng/ml  Benzodiazepine - 200 ng/ml  Cannabinoids (THC) - 50 ng/ml  Cocaine - 300 ng/ml  Fentanyl - 1.0 ng/ml  MDMA - 500 ng/ml  Opiates - 300 ng/ml   Phencyclidine (PCP) - 25 ng/ml    Specimen submitted for drug analysis and tested for pH and specific gravity in order to evaluate sample integrity. Suspect tampering if specific gravity is <1.003 and/or pH is not within the range of 4.5 - 8.0  False negatives may result form substances such as bleach  added to urine.  False positives may result for the presence of a substance with similar chemical structure to the drug or its metabolite.    This test provides only a PRELIMINARY analytical test result. A more specific alternate chemical method must be used in order to obtain a confirmed analytical result. Gas chromatography/mass spectrometry (GC/MS) is the preferred confirmatory method. Other chemical confirmation methods are available. Clinical consideration and professional judgement should be applied to any drug of abuse test result, particularly when preliminary positive results are used.    Positive results will be confirmed only at the physicians request. Unconfirmed screening results are to be used only for medical purposes (treatment).        URINALYSIS, MICROSCOPIC - Abnormal; Notable for the following components:    Mucous, UA Moderate (*)     Squamous Epithelial Cells, UA Few (*)     All other components within normal limits   TROPONIN I - Normal   B-TYPE NATRIURETIC PEPTIDE - Normal   CBC W/ AUTO DIFFERENTIAL    Narrative:     The following orders were created for panel order CBC auto differential.  Procedure                               Abnormality         Status                     ---------                               -----------         ------                     CBC with Differential[[id number]]       Abnormal            Final result                 Please view results for these tests on the individual orders.     EKG Readings: (Independently Interpreted)   Initial Reading: No STEMI. Rhythm: Normal Sinus Rhythm. Heart Rate: 61. ST Segments: Normal ST Segments. T Waves Flipped: III and AVF. Clinical Impression: Normal Sinus Rhythm Other Impression: Abnormal T-waves noted which was not present in previous EKGs       Imaging Results              X-Ray Chest 1 View (Final result)  Result time 05/15/24 06:19:12      Final result by Damaso Echevarria MD (05/15/24 06:19:12)                    Impression:      No acute cardiopulmonary process.      Electronically signed by: Damaso Echevarria  Date:    05/15/2024  Time:    06:19               Narrative:    EXAMINATION:  XR CHEST 1 VIEW    CLINICAL HISTORY:  chest pain;    TECHNIQUE:  Single view of the chest    COMPARISON:  05/31/2022    FINDINGS:  No focal opacification, pleural effusion, or pneumothorax.    The cardiomediastinal silhouette is within normal limits.    No acute osseous abnormality.                                       Medications   0.9%  NaCl infusion (0 mLs Intravenous Stopped 5/15/24 0338)     Medical Decision Making  See HPI for narrative    Differential diagnosis includes but is not limited to gastroenteritis, pancreatitis, gastritis, cholecystitis, acute coronary syndrome    Amount and/or Complexity of Data Reviewed  Labs: ordered. Decision-making details documented in ED Course.  Radiology: ordered.    Risk  Prescription drug management.               ED Course as of 05/15/24 0636   Wed May 15, 2024   0128 WBC: 6.78 [SH]   0128 Hemoglobin(!): 13.3 [SH]   0128 Hematocrit(!): 40.1 [SH]   0128 Platelet Count: 194 [SH]   0228 Lipase(!): 77  Mildly elevated lipase [SH]      ED Course User Index  [SH] Amarilis Roper MD                           Clinical Impression:  Final diagnoses:  [R07.89] Other chest pain (Primary)  [I10] Primary hypertension          ED Disposition Condition    Discharge Stable          ED Prescriptions       Medication Sig Dispense Start Date End Date Auth. Provider    amLODIPine (NORVASC) 10 MG tablet Take 1 tablet (10 mg total) by mouth once daily. 90 tablet 5/15/2024 5/15/2025 Amarilis Roper MD    ondansetron (ZOFRAN) 4 MG tablet Take 1 tablet (4 mg total) by mouth every 6 (six) hours as needed for Nausea. 12 tablet 5/15/2024 -- Amarilis Roper MD          Follow-up Information       Follow up With Specialties Details Why Contact Info    PCP  Schedule an appointment as soon as possible for a visit                 Amarilis Roper MD  05/15/24 0637

## 2024-05-16 LAB
OHS QRS DURATION: 88 MS
OHS QTC CALCULATION: 378 MS

## 2024-06-10 ENCOUNTER — HOSPITAL ENCOUNTER (EMERGENCY)
Facility: HOSPITAL | Age: 49
Discharge: HOME | End: 2024-06-10
Attending: EMERGENCY MEDICINE
Payer: COMMERCIAL

## 2024-06-10 VITALS
SYSTOLIC BLOOD PRESSURE: 162 MMHG | HEIGHT: 69 IN | WEIGHT: 157 LBS | HEART RATE: 89 BPM | RESPIRATION RATE: 18 BRPM | TEMPERATURE: 98 F | DIASTOLIC BLOOD PRESSURE: 95 MMHG | OXYGEN SATURATION: 98 % | BODY MASS INDEX: 23.25 KG/M2
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
## 2024-06-10 DIAGNOSIS — M25.571 RIGHT ANKLE PAIN: ICD-10-CM

## 2024-06-10 PROCEDURE — 99283 EMERGENCY DEPT VISIT LOW MDM: CPT | Mod: 25

## 2024-06-10 RX ORDER — HYDROCODONE BITARTRATE AND ACETAMINOPHEN 5; 325 MG/1; MG/1
1 TABLET ORAL EVERY 6 HOURS PRN
Qty: 12 TABLET | Refills: 0 | Status: SHIPPED | OUTPATIENT
Start: 2024-06-10 | End: 2024-06-13

## 2024-06-11 NOTE — ED PROVIDER NOTES
Encounter Date: 6/10/2024       History     Chief Complaint   Patient presents with    Ankle Pain     Ladder fell on R ankle. Pt c/o pain     The patient is a 49 y.o. male with a history of hypertension who presents to the Emergency Department with a chief complaint of right ankle pain. Patient reports a ladder hit his ankle a few days ago. Symptoms began 3 days ago and have been constant since onset. His pain is currently rated as a 6/10 in severity and described as aching with no radiation. Associated symptoms include nothing. Symptoms are aggravated with movement and there are no alleviating factors. The patient denies numbness or tingling to extremity. He reports taking nothing prior to arrival with no relief of symptoms. No other reported symptoms at this time     The history is provided by the patient. No  was used.   Ankle Pain  This is a new problem. The current episode started more than 2 days ago. The problem occurs daily. The problem has not changed since onset.Pertinent negatives include no chest pain, no abdominal pain, no headaches and no shortness of breath. The symptoms are aggravated by walking and bending. Nothing relieves the symptoms. He has tried nothing for the symptoms. The treatment provided no relief.     Review of patient's allergies indicates:   Allergen Reactions    Iodine Nausea And Vomiting and Hives     Other reaction(s): Hives     Past Medical History:   Diagnosis Date    Cannabis hyperemesis syndrome concurrent with and due to cannabis abuse     Diabetes mellitus     GERD (gastroesophageal reflux disease)     Hypertension      History reviewed. No pertinent surgical history.  No family history on file.  Social History     Tobacco Use    Smoking status: Never    Smokeless tobacco: Never     Review of Systems   Constitutional:  Negative for fever.   HENT:  Negative for sore throat.    Respiratory:  Negative for shortness of breath.    Cardiovascular:  Negative for  chest pain.   Gastrointestinal:  Negative for abdominal pain and nausea.   Genitourinary:  Negative for dysuria.   Musculoskeletal:  Positive for arthralgias. Negative for back pain.   Skin:  Negative for rash.   Neurological:  Negative for weakness and headaches.   Hematological:  Does not bruise/bleed easily.   All other systems reviewed and are negative.      Physical Exam     Initial Vitals [06/10/24 2053]   BP Pulse Resp Temp SpO2   (!) 162/95 89 18 98.2 °F (36.8 °C) 98 %      MAP       --         Physical Exam    Nursing note and vitals reviewed.  Constitutional: Vital signs are normal. He appears well-developed and well-nourished.   HENT:   Head: Normocephalic.   Right Ear: Hearing and tympanic membrane normal.   Left Ear: Hearing and tympanic membrane normal.   Mouth/Throat: Uvula is midline, oropharynx is clear and moist and mucous membranes are normal.   Cardiovascular:  Normal rate, regular rhythm, normal heart sounds and normal pulses.           Pulmonary/Chest: Effort normal and breath sounds normal.   Musculoskeletal:      Cervical back: No spinous process tenderness or muscular tenderness.      Right ankle: Swelling present. Tenderness present. Normal range of motion. Normal pulse.      Left ankle: Normal.      Comments: Neurovascularly intact   All other adjacent joints normal      Neurological: He is alert. GCS eye subscore is 4. GCS verbal subscore is 5. GCS motor subscore is 6.   Skin: Skin is warm, dry and intact. Capillary refill takes less than 2 seconds.         ED Course   Procedures  Labs Reviewed - No data to display       Imaging Results              X-Ray Ankle Complete Right (Final result)  Result time 06/10/24 21:14:43      Final result by Pacheco Schrader MD (06/10/24 21:14:43)                   Impression:      No acute fractures are seen      Electronically signed by: Pacheco Schrader MD  Date:    06/10/2024  Time:    21:14               Narrative:    EXAMINATION:  XR ANKLE COMPLETE 3  VIEW RIGHT    CLINICAL HISTORY:  Pain in right ankle and joints of right foot    TECHNIQUE:  AP, lateral, and oblique images of the right ankle were performed.    COMPARISON:  None    FINDINGS:  There are no fractures seen.  There is no dislocation.  There is spurring of the plantar aspect of the calcaneus.                                       Medications - No data to display  Medical Decision Making  The patient is a 49 y.o. male with a history of hypertension who presents to the Emergency Department with a chief complaint of right ankle pain. Patient reports a ladder hit his ankle a few days ago. Symptoms began 3 days ago and have been constant since onset. His pain is currently rated as a 6/10 in severity and described as aching with no radiation. Associated symptoms include nothing. Symptoms are aggravated with movement and there are no alleviating factors. The patient denies numbness or tingling to extremity. He reports taking nothing prior to arrival with no relief of symptoms. No other reported symptoms at this time     Differential diagnosis include but are not limited to ankle fracture, ankle sprain     Problems Addressed:  Right ankle pain: acute illness or injury    Amount and/or Complexity of Data Reviewed  Radiology: ordered. Decision-making details documented in ED Course.    Risk  Prescription drug management.               ED Course as of 06/10/24 2126   Mon Beka 10, 2024   2117 X-Ray Ankle Complete Right  Impression:     No acute fractures are seen      [LM]   2125 I discussed results in detail with patient including follow up. He is amendable to plan and ready for discharge home. Ace wrap applied. [LM]      ED Course User Index  [LM] Abdelrahman Azul, NP                           Clinical Impression:  Final diagnoses:  [M25.571] Right ankle pain  [W19.XXXA] Fall          ED Disposition Condition    Discharge Stable          ED Prescriptions       Medication Sig Dispense Start Date End Date Auth.  Provider    HYDROcodone-acetaminophen (NORCO) 5-325 mg per tablet Take 1 tablet by mouth every 6 (six) hours as needed for Pain. 12 tablet 6/10/2024 6/13/2024 Abdelrahman Azul NP          Follow-up Information       Follow up With Specialties Details Why Contact Info    Please contact [phone] to establish care with a primary care provider  Schedule an appointment as soon as possible for a visit                Abdelrahman Azul NP  06/10/24 2125       Abdelrahman Azul NP  06/10/24 2126

## 2025-02-11 ENCOUNTER — HOSPITAL ENCOUNTER (EMERGENCY)
Facility: HOSPITAL | Age: 50
Discharge: HOME | End: 2025-02-11
Attending: EMERGENCY MEDICINE
Payer: COMMERCIAL

## 2025-02-11 VITALS
DIASTOLIC BLOOD PRESSURE: 90 MMHG | OXYGEN SATURATION: 99 % | TEMPERATURE: 99 F | SYSTOLIC BLOOD PRESSURE: 151 MMHG | BODY MASS INDEX: 25.33 KG/M2 | HEART RATE: 68 BPM | WEIGHT: 171 LBS | RESPIRATION RATE: 16 BRPM | HEIGHT: 69 IN

## 2025-02-11 DIAGNOSIS — R06.02 SHORTNESS OF BREATH: ICD-10-CM

## 2025-02-11 LAB
ALBUMIN SERPL-MCNC: 3.8 G/DL (ref 3.5–5)
ALBUMIN/GLOB SERPL: 1.2 RATIO (ref 1.1–2)
ALP SERPL-CCNC: 74 UNIT/L (ref 40–150)
ALT SERPL-CCNC: 8 UNIT/L (ref 0–55)
ANION GAP SERPL CALC-SCNC: 11 MEQ/L
AST SERPL-CCNC: 14 UNIT/L (ref 5–34)
BASOPHILS # BLD AUTO: 0.03 X10(3)/MCL
BASOPHILS NFR BLD AUTO: 0.6 %
BILIRUB SERPL-MCNC: 0.5 MG/DL
BNP BLD-MCNC: 72.7 PG/ML
BUN SERPL-MCNC: 15.5 MG/DL (ref 8.4–25.7)
CALCIUM SERPL-MCNC: 9.1 MG/DL (ref 8.4–10.2)
CHLORIDE SERPL-SCNC: 108 MMOL/L (ref 98–107)
CO2 SERPL-SCNC: 21 MMOL/L (ref 22–29)
CREAT SERPL-MCNC: 1.35 MG/DL (ref 0.72–1.25)
CREAT/UREA NIT SERPL: 11
EOSINOPHIL # BLD AUTO: 0.17 X10(3)/MCL (ref 0–0.9)
EOSINOPHIL NFR BLD AUTO: 3.3 %
ERYTHROCYTE [DISTWIDTH] IN BLOOD BY AUTOMATED COUNT: 14.6 % (ref 11.5–17)
FLUAV AG UPPER RESP QL IA.RAPID: NOT DETECTED
FLUBV AG UPPER RESP QL IA.RAPID: NOT DETECTED
GFR SERPLBLD CREATININE-BSD FMLA CKD-EPI: >60 ML/MIN/1.73/M2
GLOBULIN SER-MCNC: 3.3 GM/DL (ref 2.4–3.5)
GLUCOSE SERPL-MCNC: 154 MG/DL (ref 74–100)
HCT VFR BLD AUTO: 41.3 % (ref 42–52)
HGB BLD-MCNC: 13.8 G/DL (ref 14–18)
IMM GRANULOCYTES # BLD AUTO: 0.01 X10(3)/MCL (ref 0–0.04)
IMM GRANULOCYTES NFR BLD AUTO: 0.2 %
LYMPHOCYTES # BLD AUTO: 2.02 X10(3)/MCL (ref 0.6–4.6)
LYMPHOCYTES NFR BLD AUTO: 38.9 %
MCH RBC QN AUTO: 28.9 PG (ref 27–31)
MCHC RBC AUTO-ENTMCNC: 33.4 G/DL (ref 33–36)
MCV RBC AUTO: 86.4 FL (ref 80–94)
MONOCYTES # BLD AUTO: 0.3 X10(3)/MCL (ref 0.1–1.3)
MONOCYTES NFR BLD AUTO: 5.8 %
NEUTROPHILS # BLD AUTO: 2.66 X10(3)/MCL (ref 2.1–9.2)
NEUTROPHILS NFR BLD AUTO: 51.2 %
NRBC BLD AUTO-RTO: 0 %
OHS QRS DURATION: 86 MS
OHS QTC CALCULATION: 401 MS
PLATELET # BLD AUTO: 205 X10(3)/MCL (ref 130–400)
PMV BLD AUTO: 10.5 FL (ref 7.4–10.4)
POTASSIUM SERPL-SCNC: 4 MMOL/L (ref 3.5–5.1)
PROT SERPL-MCNC: 7.1 GM/DL (ref 6.4–8.3)
RBC # BLD AUTO: 4.78 X10(6)/MCL (ref 4.7–6.1)
RSV A 5' UTR RNA NPH QL NAA+PROBE: NOT DETECTED
SARS-COV-2 RNA RESP QL NAA+PROBE: NOT DETECTED
SODIUM SERPL-SCNC: 140 MMOL/L (ref 136–145)
TROPONIN I SERPL-MCNC: <0.01 NG/ML (ref 0–0.04)
WBC # BLD AUTO: 5.19 X10(3)/MCL (ref 4.5–11.5)

## 2025-02-11 PROCEDURE — 83880 ASSAY OF NATRIURETIC PEPTIDE: CPT | Performed by: EMERGENCY MEDICINE

## 2025-02-11 PROCEDURE — 0241U COVID/RSV/FLU A&B PCR: CPT | Performed by: EMERGENCY MEDICINE

## 2025-02-11 PROCEDURE — 80053 COMPREHEN METABOLIC PANEL: CPT | Performed by: EMERGENCY MEDICINE

## 2025-02-11 PROCEDURE — 93010 ELECTROCARDIOGRAM REPORT: CPT | Mod: ,,, | Performed by: INTERNAL MEDICINE

## 2025-02-11 PROCEDURE — 93005 ELECTROCARDIOGRAM TRACING: CPT

## 2025-02-11 PROCEDURE — 84484 ASSAY OF TROPONIN QUANT: CPT | Performed by: EMERGENCY MEDICINE

## 2025-02-11 PROCEDURE — 85025 COMPLETE CBC W/AUTO DIFF WBC: CPT | Performed by: EMERGENCY MEDICINE

## 2025-02-11 PROCEDURE — 99285 EMERGENCY DEPT VISIT HI MDM: CPT | Mod: 25

## 2025-02-11 RX ORDER — METHYLPREDNISOLONE 4 MG/1
TABLET ORAL
Qty: 21 EACH | Refills: 0 | Status: SHIPPED | OUTPATIENT
Start: 2025-02-11 | End: 2025-03-04

## 2025-02-11 NOTE — ED TRIAGE NOTES
"C/o fatigue with an increased sob worst with exertion. Worst over past few day. Self reports hx of "clogged artery"  "

## 2025-02-11 NOTE — ED PROVIDER NOTES
Encounter Date: 2/11/2025       History     Chief Complaint   Patient presents with    Shortness of Breath     Patient is a 49-year-old male presenting with complain of shortness breath with exertion over the last couple of days.  Patient also states he has a cough.  He states he is having some anterior chest pain with coughing.  Patient denies any chest pain otherwise.  Patient denies any fever chills.  No abdominal pain.  No nausea vomiting.      Review of patient's allergies indicates:   Allergen Reactions    Iodine Nausea And Vomiting and Hives     Other reaction(s): Hives     Past Medical History:   Diagnosis Date    Cannabis hyperemesis syndrome concurrent with and due to cannabis abuse     Diabetes mellitus     GERD (gastroesophageal reflux disease)     Hypertension      Past Surgical History:   Procedure Laterality Date    HAND NERVE REPAIR Right      No family history on file.  Social History     Tobacco Use    Smoking status: Every Day     Types: Cigars    Smokeless tobacco: Never    Tobacco comments:     1-2 black n mild a day   Substance Use Topics    Alcohol use: Yes     Alcohol/week: 2.0 standard drinks of alcohol     Types: 2 Standard drinks or equivalent per week     Comment: rarely    Drug use: Never     Review of Systems   All other systems reviewed and are negative.      Physical Exam     Initial Vitals [02/11/25 1710]   BP Pulse Resp Temp SpO2   (!) 151/90 68 16 98.9 °F (37.2 °C) 99 %      MAP       --         Physical Exam    Nursing note and vitals reviewed.  Constitutional: He appears well-developed and well-nourished.   HENT:   Head: Normocephalic.   Neck: Neck supple.   Normal range of motion.  Cardiovascular:  Normal rate, regular rhythm and normal heart sounds.           Pulmonary/Chest: Breath sounds normal. No respiratory distress. He has no wheezes. He has no rhonchi. He has no rales.   Abdominal: Abdomen is soft. He exhibits no distension. There is no abdominal tenderness. There is no  guarding.   Musculoskeletal:         General: Normal range of motion.      Cervical back: Normal range of motion and neck supple.     Neurological: He is alert and oriented to person, place, and time. He has normal strength.   Skin: Skin is warm.   Psychiatric: He has a normal mood and affect. Thought content normal.         ED Course   Procedures  Labs Reviewed   COMPREHENSIVE METABOLIC PANEL - Abnormal       Result Value    Sodium 140      Potassium 4.0      Chloride 108 (*)     CO2 21 (*)     Glucose 154 (*)     Blood Urea Nitrogen 15.5      Creatinine 1.35 (*)     Calcium 9.1      Protein Total 7.1      Albumin 3.8      Globulin 3.3      Albumin/Globulin Ratio 1.2      Bilirubin Total 0.5      ALP 74      ALT 8      AST 14      eGFR >60      Anion Gap 11.0      BUN/Creatinine Ratio 11     CBC WITH DIFFERENTIAL - Abnormal    WBC 5.19      RBC 4.78      Hgb 13.8 (*)     Hct 41.3 (*)     MCV 86.4      MCH 28.9      MCHC 33.4      RDW 14.6      Platelet 205      MPV 10.5 (*)     Neut % 51.2      Lymph % 38.9      Mono % 5.8      Eos % 3.3      Basophil % 0.6      Imm Grans % 0.2      Neut # 2.66      Lymph # 2.02      Mono # 0.30      Eos # 0.17      Baso # 0.03      Imm Gran # 0.01      NRBC% 0.0     B-TYPE NATRIURETIC PEPTIDE - Normal    Natriuretic Peptide 72.7     COVID/RSV/FLU A&B PCR - Normal    Influenza A PCR Not Detected      Influenza B PCR Not Detected      Respiratory Syncytial Virus PCR Not Detected      SARS-CoV-2 PCR Not Detected      Narrative:     The Xpert Xpress SARS-CoV-2/FLU/RSV plus is a rapid, multiplexed real-time PCR test intended for the simultaneous qualitative detection and differentiation of SARS-CoV-2, Influenza A, Influenza B, and respiratory syncytial virus (RSV) viral RNA in either nasopharyngeal swab or nasal swab specimens.         TROPONIN I - Normal    Troponin-I <0.010     CBC W/ AUTO DIFFERENTIAL    Narrative:     The following orders were created for panel order CBC auto  differential.  Procedure                               Abnormality         Status                     ---------                               -----------         ------                     CBC with Differential[[id number]]       Abnormal            Final result                 Please view results for these tests on the individual orders.     EKG Readings: (Independently Interpreted)   Initial Reading: No STEMI. Rhythm: Normal Sinus Rhythm. Heart Rate: 79. Ectopy: No Ectopy. Conduction: Normal. ST Segments: Normal ST Segments. T Waves: Normal. Axis: Normal.     ECG Results              EKG 12-lead (In process)        Collection Time Result Time QRS Duration OHS QTC Calculation    02/11/25 17:14:32 02/11/25 17:41:57 86 401                     In process by Interface, Lab In [facility] (02/11/25 17:42:04)                   Narrative:    Test Reason : R06.02,    Vent. Rate :  79 BPM     Atrial Rate :  79 BPM     P-R Int : 166 ms          QRS Dur :  86 ms      QT Int : 350 ms       P-R-T Axes :  52  15 -18 degrees    QTcB Int : 401 ms    Normal sinus rhythm  Possible Anterior infarct (cited on or before 15-May-2024)  Abnormal ECG  When compared with ECG of 15-May-2024 01:04,  Questionable change in The axis  T wave inversion less evident in Inferior leads  Nonspecific T wave abnormality, worse in Lateral leads    Referred By: AAAREFERRAL SELF           Confirmed By:                                   Imaging Results              X-Ray Chest AP Portable (Final result)  Result time 02/11/25 17:48:58      Final result by Frankie Rolon MD (02/11/25 17:48:58)                   Narrative:    EXAMINATION  XR CHEST AP PORTABLE    CLINICAL HISTORY  Shortness breath;    TECHNIQUE  A total of 1 frontal image(s) of the chest.    COMPARISON  15 May 2024    FINDINGS  Lines/tubes/devices: none present    The cardiomediastinal silhouette and central pulmonary vasculature are unremarkable for utilized technique.  The trachea is midline.  There is no lobar consolidation, pleural effusion, or pneumothorax.    There is no acute osseous or extrathoracic abnormality.    IMPRESSION  No convincing acute abnormality.      Electronically signed by: Frankie Rolon  Date:    02/11/2025  Time:    17:48                                  X-Rays:   Independently Interpreted Readings:   Other Readings:  No acute changes seen on chest x-ray    Medications - No data to display  Medical Decision Making  Differential diagnosis:  Viral upper respiratory infection, pneumonia, pulmonary edema, bronchospasm, acute coronary syndrome.    Amount and/or Complexity of Data Reviewed  Labs: ordered.     Details: All labs are stable  Radiology: ordered and independent interpretation performed.  ECG/medicine tests: ordered. Decision-making details documented in ED Course.  Discussion of management or test interpretation with external provider(s): At time of discharge, patient remains in no apparent distress.  Patient most likely has an upper respiratory infection.  He has no wheezing on exam and O2 sat is 100% on room air time of discharge.  Will DC with a prescription for Medrol Dosepak.               ED Course as of 02/11/25 1838 Tue Feb 11, 2025 1834 Patient remains in no apparent distress.  O2 saturation is 100% on room air. [KG]      ED Course User Index  [KG] Ryan Dominguez MD                           Clinical Impression:  Final diagnoses:  [R06.02] Shortness of breath          ED Disposition Condition    Discharge Stable          ED Prescriptions       Medication Sig Dispense Start Date End Date Auth. Provider    methylPREDNISolone (MEDROL DOSEPACK) 4 mg tablet use as directed 21 each 2/11/2025 3/4/2025 Ryan Dominguez MD          Follow-up Information    None          Ryan Dominguez MD  02/11/25 1838

## 2025-08-01 ENCOUNTER — HOSPITAL ENCOUNTER (EMERGENCY)
Facility: HOSPITAL | Age: 50
Discharge: HOME | End: 2025-08-01
Attending: FAMILY MEDICINE
Payer: COMMERCIAL

## 2025-08-01 VITALS
BODY MASS INDEX: 27.55 KG/M2 | RESPIRATION RATE: 20 BRPM | WEIGHT: 186 LBS | TEMPERATURE: 98 F | OXYGEN SATURATION: 97 % | SYSTOLIC BLOOD PRESSURE: 142 MMHG | HEIGHT: 69 IN | DIASTOLIC BLOOD PRESSURE: 92 MMHG | HEART RATE: 97 BPM

## 2025-08-01 DIAGNOSIS — S62.339A CLOSED BOXER'S FRACTURE, INITIAL ENCOUNTER: Primary | ICD-10-CM

## 2025-08-01 PROCEDURE — 99283 EMERGENCY DEPT VISIT LOW MDM: CPT | Mod: 25,RT

## 2025-08-01 PROCEDURE — 29125 APPL SHORT ARM SPLINT STATIC: CPT | Mod: RT

## 2025-08-01 RX ORDER — HYDROCODONE BITARTRATE AND ACETAMINOPHEN 10; 325 MG/1; MG/1
1 TABLET ORAL EVERY 6 HOURS PRN
Qty: 10 TABLET | Refills: 0 | Status: SHIPPED | OUTPATIENT
Start: 2025-08-01 | End: 2025-08-11 | Stop reason: ALTCHOICE

## 2025-08-11 ENCOUNTER — HOSPITAL ENCOUNTER (EMERGENCY)
Facility: HOSPITAL | Age: 50
Discharge: HOME | End: 2025-08-11
Attending: STUDENT IN AN ORGANIZED HEALTH CARE EDUCATION/TRAINING PROGRAM
Payer: COMMERCIAL

## 2025-08-11 ENCOUNTER — TELEPHONE (OUTPATIENT)
Dept: ORTHOPEDICS | Facility: CLINIC | Age: 50
End: 2025-08-11
Payer: COMMERCIAL

## 2025-08-11 VITALS
OXYGEN SATURATION: 98 % | HEART RATE: 89 BPM | TEMPERATURE: 98 F | BODY MASS INDEX: 28.73 KG/M2 | WEIGHT: 194 LBS | RESPIRATION RATE: 18 BRPM | DIASTOLIC BLOOD PRESSURE: 95 MMHG | HEIGHT: 69 IN | SYSTOLIC BLOOD PRESSURE: 156 MMHG

## 2025-08-11 DIAGNOSIS — S62.336D CLOSED DISPLACED FRACTURE OF NECK OF FIFTH METACARPAL BONE OF RIGHT HAND WITH ROUTINE HEALING, SUBSEQUENT ENCOUNTER: Primary | ICD-10-CM

## 2025-08-11 PROCEDURE — 99283 EMERGENCY DEPT VISIT LOW MDM: CPT

## 2025-08-11 RX ORDER — HYDROCODONE BITARTRATE AND ACETAMINOPHEN 7.5; 325 MG/1; MG/1
1 TABLET ORAL EVERY 6 HOURS PRN
Qty: 12 TABLET | Refills: 0 | Status: SHIPPED | OUTPATIENT
Start: 2025-08-11

## 2025-08-12 ENCOUNTER — DOCUMENTATION ONLY (OUTPATIENT)
Dept: CASE MANAGEMENT | Facility: HOSPITAL | Age: 50
End: 2025-08-12
Payer: COMMERCIAL

## 2025-08-19 ENCOUNTER — HOSPITAL ENCOUNTER (EMERGENCY)
Facility: HOSPITAL | Age: 50
Discharge: HOME | End: 2025-08-19
Attending: FAMILY MEDICINE
Payer: COMMERCIAL

## 2025-08-19 VITALS
DIASTOLIC BLOOD PRESSURE: 97 MMHG | WEIGHT: 198 LBS | OXYGEN SATURATION: 97 % | BODY MASS INDEX: 29.33 KG/M2 | RESPIRATION RATE: 16 BRPM | SYSTOLIC BLOOD PRESSURE: 160 MMHG | HEIGHT: 69 IN | HEART RATE: 98 BPM | TEMPERATURE: 98 F

## 2025-08-19 DIAGNOSIS — S62.336D CLOSED DISPLACED FRACTURE OF NECK OF FIFTH METACARPAL BONE OF RIGHT HAND WITH ROUTINE HEALING, SUBSEQUENT ENCOUNTER: Primary | ICD-10-CM

## 2025-08-19 PROCEDURE — 25000003 PHARM REV CODE 250: Performed by: FAMILY MEDICINE

## 2025-08-19 PROCEDURE — 63600175 PHARM REV CODE 636 W HCPCS: Mod: JZ,TB | Performed by: FAMILY MEDICINE

## 2025-08-19 PROCEDURE — 96372 THER/PROPH/DIAG INJ SC/IM: CPT | Performed by: FAMILY MEDICINE

## 2025-08-19 PROCEDURE — 99284 EMERGENCY DEPT VISIT MOD MDM: CPT | Mod: 25

## 2025-08-19 RX ORDER — MELOXICAM 7.5 MG/1
15 TABLET ORAL DAILY PRN
Qty: 14 TABLET | Refills: 0 | Status: SHIPPED | OUTPATIENT
Start: 2025-08-19 | End: 2025-09-02

## 2025-08-19 RX ORDER — HYDROCODONE BITARTRATE AND ACETAMINOPHEN 7.5; 325 MG/1; MG/1
1 TABLET ORAL
Refills: 0 | Status: COMPLETED | OUTPATIENT
Start: 2025-08-19 | End: 2025-08-19

## 2025-08-19 RX ORDER — KETOROLAC TROMETHAMINE 30 MG/ML
30 INJECTION, SOLUTION INTRAMUSCULAR; INTRAVENOUS
Status: COMPLETED | OUTPATIENT
Start: 2025-08-19 | End: 2025-08-19

## 2025-08-19 RX ADMIN — KETOROLAC TROMETHAMINE 30 MG: 30 INJECTION, SOLUTION INTRAMUSCULAR; INTRAVENOUS at 02:08

## 2025-08-19 RX ADMIN — HYDROCODONE BITARTRATE AND ACETAMINOPHEN 1 TABLET: 7.5; 325 TABLET ORAL at 02:08
